# Patient Record
Sex: MALE | Race: WHITE | NOT HISPANIC OR LATINO | Employment: UNEMPLOYED | URBAN - METROPOLITAN AREA
[De-identification: names, ages, dates, MRNs, and addresses within clinical notes are randomized per-mention and may not be internally consistent; named-entity substitution may affect disease eponyms.]

---

## 2018-04-05 PROBLEM — Z79.899 CONTROLLED SUBSTANCE AGREEMENT SIGNED: Status: ACTIVE | Noted: 2018-04-05

## 2018-04-11 PROBLEM — Z79.899 CONTROLLED SUBSTANCE AGREEMENT SIGNED: Status: RESOLVED | Noted: 2018-04-05 | Resolved: 2018-04-11

## 2018-04-11 PROBLEM — Z91.148 CONTROLLED SUBSTANCE AGREEMENT BROKEN: Status: ACTIVE | Noted: 2018-04-11

## 2019-01-15 PROBLEM — Z79.899 CONTROLLED SUBSTANCE AGREEMENT SIGNED: Status: ACTIVE | Noted: 2019-01-15

## 2019-01-15 PROBLEM — Z91.148 CONTROLLED SUBSTANCE AGREEMENT BROKEN: Status: RESOLVED | Noted: 2018-04-11 | Resolved: 2019-01-15

## 2021-08-23 PROBLEM — R93.5 ABNORMAL ABDOMINAL ULTRASOUND: Status: ACTIVE | Noted: 2021-08-23

## 2023-01-26 PROBLEM — R79.89 ELEVATED LFTS: Status: ACTIVE | Noted: 2023-01-26

## 2023-02-15 ENCOUNTER — HOSPITAL ENCOUNTER (INPATIENT)
Facility: MEDICAL CENTER | Age: 38
LOS: 3 days | DRG: 419 | End: 2023-02-18
Attending: INTERNAL MEDICINE | Admitting: INTERNAL MEDICINE
Payer: COMMERCIAL

## 2023-02-15 PROBLEM — K80.50 CHOLEDOCHOLITHIASIS: Status: ACTIVE | Noted: 2023-02-15

## 2023-02-15 PROCEDURE — 700111 HCHG RX REV CODE 636 W/ 250 OVERRIDE (IP): Performed by: INTERNAL MEDICINE

## 2023-02-15 PROCEDURE — 700102 HCHG RX REV CODE 250 W/ 637 OVERRIDE(OP): Performed by: INTERNAL MEDICINE

## 2023-02-15 PROCEDURE — 770004 HCHG ROOM/CARE - ONCOLOGY PRIVATE *

## 2023-02-15 PROCEDURE — A9270 NON-COVERED ITEM OR SERVICE: HCPCS | Performed by: INTERNAL MEDICINE

## 2023-02-15 PROCEDURE — 99223 1ST HOSP IP/OBS HIGH 75: CPT | Performed by: INTERNAL MEDICINE

## 2023-02-15 RX ORDER — DIPHENHYDRAMINE HYDROCHLORIDE 50 MG/ML
25 INJECTION INTRAMUSCULAR; INTRAVENOUS EVERY 6 HOURS PRN
Status: DISCONTINUED | OUTPATIENT
Start: 2023-02-15 | End: 2023-02-18 | Stop reason: HOSPADM

## 2023-02-15 RX ORDER — BISACODYL 10 MG
10 SUPPOSITORY, RECTAL RECTAL
Status: DISCONTINUED | OUTPATIENT
Start: 2023-02-15 | End: 2023-02-18 | Stop reason: HOSPADM

## 2023-02-15 RX ORDER — ONDANSETRON 2 MG/ML
4 INJECTION INTRAMUSCULAR; INTRAVENOUS EVERY 4 HOURS PRN
Status: DISCONTINUED | OUTPATIENT
Start: 2023-02-15 | End: 2023-02-15

## 2023-02-15 RX ORDER — OMEPRAZOLE 20 MG/1
20 CAPSULE, DELAYED RELEASE ORAL 2 TIMES DAILY
Status: DISCONTINUED | OUTPATIENT
Start: 2023-02-15 | End: 2023-02-18 | Stop reason: HOSPADM

## 2023-02-15 RX ORDER — POLYETHYLENE GLYCOL 3350 17 G/17G
1 POWDER, FOR SOLUTION ORAL
Status: DISCONTINUED | OUTPATIENT
Start: 2023-02-15 | End: 2023-02-18 | Stop reason: HOSPADM

## 2023-02-15 RX ORDER — PROCHLORPERAZINE EDISYLATE 5 MG/ML
5-10 INJECTION INTRAMUSCULAR; INTRAVENOUS EVERY 4 HOURS PRN
Status: DISCONTINUED | OUTPATIENT
Start: 2023-02-15 | End: 2023-02-18 | Stop reason: HOSPADM

## 2023-02-15 RX ORDER — ONDANSETRON 4 MG/1
4 TABLET, ORALLY DISINTEGRATING ORAL EVERY 4 HOURS PRN
Status: DISCONTINUED | OUTPATIENT
Start: 2023-02-15 | End: 2023-02-18 | Stop reason: HOSPADM

## 2023-02-15 RX ORDER — DULOXETIN HYDROCHLORIDE 20 MG/1
60 CAPSULE, DELAYED RELEASE ORAL DAILY
Status: DISCONTINUED | OUTPATIENT
Start: 2023-02-16 | End: 2023-02-18 | Stop reason: HOSPADM

## 2023-02-15 RX ORDER — AMOXICILLIN 250 MG
2 CAPSULE ORAL 2 TIMES DAILY
Status: DISCONTINUED | OUTPATIENT
Start: 2023-02-16 | End: 2023-02-18 | Stop reason: HOSPADM

## 2023-02-15 RX ORDER — PROMETHAZINE HYDROCHLORIDE 25 MG/1
12.5-25 SUPPOSITORY RECTAL EVERY 4 HOURS PRN
Status: DISCONTINUED | OUTPATIENT
Start: 2023-02-15 | End: 2023-02-18 | Stop reason: HOSPADM

## 2023-02-15 RX ORDER — CLONAZEPAM 1 MG/1
1 TABLET ORAL
Status: DISCONTINUED | OUTPATIENT
Start: 2023-02-15 | End: 2023-02-18 | Stop reason: HOSPADM

## 2023-02-15 RX ORDER — PROMETHAZINE HYDROCHLORIDE 25 MG/1
12.5-25 TABLET ORAL EVERY 4 HOURS PRN
Status: DISCONTINUED | OUTPATIENT
Start: 2023-02-15 | End: 2023-02-18 | Stop reason: HOSPADM

## 2023-02-15 RX ORDER — ONDANSETRON 2 MG/ML
4 INJECTION INTRAMUSCULAR; INTRAVENOUS EVERY 4 HOURS PRN
Status: DISCONTINUED | OUTPATIENT
Start: 2023-02-15 | End: 2023-02-18 | Stop reason: HOSPADM

## 2023-02-15 RX ADMIN — OMEPRAZOLE 20 MG: 20 CAPSULE, DELAYED RELEASE ORAL at 22:44

## 2023-02-15 RX ADMIN — DIPHENHYDRAMINE HYDROCHLORIDE 25 MG: 50 INJECTION INTRAMUSCULAR; INTRAVENOUS at 22:44

## 2023-02-15 ASSESSMENT — COGNITIVE AND FUNCTIONAL STATUS - GENERAL
SUGGESTED CMS G CODE MODIFIER DAILY ACTIVITY: CH
DAILY ACTIVITIY SCORE: 24
MOBILITY SCORE: 24
SUGGESTED CMS G CODE MODIFIER MOBILITY: CH

## 2023-02-15 ASSESSMENT — ENCOUNTER SYMPTOMS
NAUSEA: 0
VOMITING: 0
CHILLS: 0
ABDOMINAL PAIN: 0
INSOMNIA: 0
EYE PAIN: 0
BACK PAIN: 0
SHORTNESS OF BREATH: 0
HEADACHES: 0
ROS SKIN COMMENTS: YELLOW SKIN
PALPITATIONS: 0
BLURRED VISION: 0
FEVER: 0
DIZZINESS: 0
COUGH: 0
NERVOUS/ANXIOUS: 0

## 2023-02-15 ASSESSMENT — LIFESTYLE VARIABLES
EVER HAD A DRINK FIRST THING IN THE MORNING TO STEADY YOUR NERVES TO GET RID OF A HANGOVER: NO
HAVE YOU EVER FELT YOU SHOULD CUT DOWN ON YOUR DRINKING: NO
ALCOHOL_USE: NO
EVER FELT BAD OR GUILTY ABOUT YOUR DRINKING: NO
CONSUMPTION TOTAL: NEGATIVE
TOTAL SCORE: 0
DOES PATIENT WANT TO STOP DRINKING: NO
TOTAL SCORE: 0
ON A TYPICAL DAY WHEN YOU DRINK ALCOHOL HOW MANY DRINKS DO YOU HAVE: 0
HAVE PEOPLE ANNOYED YOU BY CRITICIZING YOUR DRINKING: NO
TOTAL SCORE: 0
AVERAGE NUMBER OF DAYS PER WEEK YOU HAVE A DRINK CONTAINING ALCOHOL: 0
HOW MANY TIMES IN THE PAST YEAR HAVE YOU HAD 5 OR MORE DRINKS IN A DAY: 0

## 2023-02-15 ASSESSMENT — FIBROSIS 4 INDEX: FIB4 SCORE: 2.1

## 2023-02-15 ASSESSMENT — PATIENT HEALTH QUESTIONNAIRE - PHQ9
SUM OF ALL RESPONSES TO PHQ9 QUESTIONS 1 AND 2: 0
2. FEELING DOWN, DEPRESSED, IRRITABLE, OR HOPELESS: NOT AT ALL
1. LITTLE INTEREST OR PLEASURE IN DOING THINGS: NOT AT ALL

## 2023-02-15 ASSESSMENT — PAIN DESCRIPTION - PAIN TYPE: TYPE: ACUTE PAIN

## 2023-02-16 ENCOUNTER — ANESTHESIA EVENT (OUTPATIENT)
Dept: SURGERY | Facility: MEDICAL CENTER | Age: 38
DRG: 419 | End: 2023-02-16
Payer: COMMERCIAL

## 2023-02-16 ENCOUNTER — APPOINTMENT (OUTPATIENT)
Dept: RADIOLOGY | Facility: MEDICAL CENTER | Age: 38
DRG: 419 | End: 2023-02-16
Attending: INTERNAL MEDICINE
Payer: COMMERCIAL

## 2023-02-16 ENCOUNTER — ANESTHESIA (OUTPATIENT)
Dept: SURGERY | Facility: MEDICAL CENTER | Age: 38
DRG: 419 | End: 2023-02-16
Payer: COMMERCIAL

## 2023-02-16 LAB
ALBUMIN SERPL BCP-MCNC: 3.9 G/DL (ref 3.2–4.9)
ALBUMIN/GLOB SERPL: 1.4 G/DL
ALP SERPL-CCNC: 342 U/L (ref 30–99)
ALT SERPL-CCNC: 303 U/L (ref 2–50)
ANION GAP SERPL CALC-SCNC: 11 MMOL/L (ref 7–16)
AST SERPL-CCNC: 126 U/L (ref 12–45)
BILIRUB SERPL-MCNC: 3.5 MG/DL (ref 0.1–1.5)
BUN SERPL-MCNC: 11 MG/DL (ref 8–22)
CALCIUM ALBUM COR SERPL-MCNC: 9.5 MG/DL (ref 8.5–10.5)
CALCIUM SERPL-MCNC: 9.4 MG/DL (ref 8.5–10.5)
CHLORIDE SERPL-SCNC: 106 MMOL/L (ref 96–112)
CO2 SERPL-SCNC: 22 MMOL/L (ref 20–33)
CREAT SERPL-MCNC: 0.86 MG/DL (ref 0.5–1.4)
ERYTHROCYTE [DISTWIDTH] IN BLOOD BY AUTOMATED COUNT: 38.3 FL (ref 35.9–50)
GFR SERPLBLD CREATININE-BSD FMLA CKD-EPI: 113 ML/MIN/1.73 M 2
GLOBULIN SER CALC-MCNC: 2.7 G/DL (ref 1.9–3.5)
GLUCOSE SERPL-MCNC: 79 MG/DL (ref 65–99)
HCT VFR BLD AUTO: 39.9 % (ref 42–52)
HGB BLD-MCNC: 13.6 G/DL (ref 14–18)
MAGNESIUM SERPL-MCNC: 1.9 MG/DL (ref 1.5–2.5)
MCH RBC QN AUTO: 30.8 PG (ref 27–33)
MCHC RBC AUTO-ENTMCNC: 34.1 G/DL (ref 33.7–35.3)
MCV RBC AUTO: 90.3 FL (ref 81.4–97.8)
PLATELET # BLD AUTO: 197 K/UL (ref 164–446)
PMV BLD AUTO: 9.7 FL (ref 9–12.9)
POTASSIUM SERPL-SCNC: 3.9 MMOL/L (ref 3.6–5.5)
PROT SERPL-MCNC: 6.6 G/DL (ref 6–8.2)
RBC # BLD AUTO: 4.42 M/UL (ref 4.7–6.1)
SODIUM SERPL-SCNC: 139 MMOL/L (ref 135–145)
WBC # BLD AUTO: 5.1 K/UL (ref 4.8–10.8)

## 2023-02-16 PROCEDURE — 36415 COLL VENOUS BLD VENIPUNCTURE: CPT

## 2023-02-16 PROCEDURE — 700102 HCHG RX REV CODE 250 W/ 637 OVERRIDE(OP): Performed by: INTERNAL MEDICINE

## 2023-02-16 PROCEDURE — C1769 GUIDE WIRE: HCPCS | Performed by: INTERNAL MEDICINE

## 2023-02-16 PROCEDURE — 700111 HCHG RX REV CODE 636 W/ 250 OVERRIDE (IP): Performed by: ANESTHESIOLOGY

## 2023-02-16 PROCEDURE — 700102 HCHG RX REV CODE 250 W/ 637 OVERRIDE(OP)

## 2023-02-16 PROCEDURE — 80053 COMPREHEN METABOLIC PANEL: CPT

## 2023-02-16 PROCEDURE — 700117 HCHG RX CONTRAST REV CODE 255: Performed by: INTERNAL MEDICINE

## 2023-02-16 PROCEDURE — 83735 ASSAY OF MAGNESIUM: CPT

## 2023-02-16 PROCEDURE — 43264 ERCP REMOVE DUCT CALCULI: CPT | Performed by: INTERNAL MEDICINE

## 2023-02-16 PROCEDURE — 160039 HCHG SURGERY MINUTES - EA ADDL 1 MIN LEVEL 3: Performed by: INTERNAL MEDICINE

## 2023-02-16 PROCEDURE — 0FC98ZZ EXTIRPATION OF MATTER FROM COMMON BILE DUCT, VIA NATURAL OR ARTIFICIAL OPENING ENDOSCOPIC: ICD-10-PCS | Performed by: INTERNAL MEDICINE

## 2023-02-16 PROCEDURE — 160035 HCHG PACU - 1ST 60 MINS PHASE I: Performed by: INTERNAL MEDICINE

## 2023-02-16 PROCEDURE — A9270 NON-COVERED ITEM OR SERVICE: HCPCS | Performed by: INTERNAL MEDICINE

## 2023-02-16 PROCEDURE — 160028 HCHG SURGERY MINUTES - 1ST 30 MINS LEVEL 3: Performed by: INTERNAL MEDICINE

## 2023-02-16 PROCEDURE — 43262 ENDO CHOLANGIOPANCREATOGRAPH: CPT | Performed by: INTERNAL MEDICINE

## 2023-02-16 PROCEDURE — 700101 HCHG RX REV CODE 250: Performed by: ANESTHESIOLOGY

## 2023-02-16 PROCEDURE — 99999 PR NO CHARGE: CPT | Performed by: SPECIALIST

## 2023-02-16 PROCEDURE — 770004 HCHG ROOM/CARE - ONCOLOGY PRIVATE *

## 2023-02-16 PROCEDURE — 700105 HCHG RX REV CODE 258: Performed by: ANESTHESIOLOGY

## 2023-02-16 PROCEDURE — 99252 IP/OBS CONSLTJ NEW/EST SF 35: CPT | Performed by: SURGERY

## 2023-02-16 PROCEDURE — 160002 HCHG RECOVERY MINUTES (STAT): Performed by: INTERNAL MEDICINE

## 2023-02-16 PROCEDURE — 00732 ANES UPR GI NDSC PX ERCP: CPT | Performed by: ANESTHESIOLOGY

## 2023-02-16 PROCEDURE — 160048 HCHG OR STATISTICAL LEVEL 1-5: Performed by: INTERNAL MEDICINE

## 2023-02-16 PROCEDURE — 99233 SBSQ HOSP IP/OBS HIGH 50: CPT | Performed by: INTERNAL MEDICINE

## 2023-02-16 PROCEDURE — 160009 HCHG ANES TIME/MIN: Performed by: INTERNAL MEDICINE

## 2023-02-16 PROCEDURE — 85027 COMPLETE CBC AUTOMATED: CPT

## 2023-02-16 PROCEDURE — C1889 IMPLANT/INSERT DEVICE, NOC: HCPCS | Performed by: INTERNAL MEDICINE

## 2023-02-16 PROCEDURE — A9270 NON-COVERED ITEM OR SERVICE: HCPCS

## 2023-02-16 RX ORDER — SODIUM CHLORIDE, SODIUM LACTATE, POTASSIUM CHLORIDE, CALCIUM CHLORIDE 600; 310; 30; 20 MG/100ML; MG/100ML; MG/100ML; MG/100ML
INJECTION, SOLUTION INTRAVENOUS CONTINUOUS
Status: DISCONTINUED | OUTPATIENT
Start: 2023-02-16 | End: 2023-02-16 | Stop reason: HOSPADM

## 2023-02-16 RX ORDER — OXYCODONE HYDROCHLORIDE 5 MG/1
5 TABLET ORAL
Status: DISCONTINUED | OUTPATIENT
Start: 2023-02-16 | End: 2023-02-18 | Stop reason: HOSPADM

## 2023-02-16 RX ORDER — INDOMETHACIN 50 MG/1
SUPPOSITORY RECTAL
Status: COMPLETED
Start: 2023-02-16 | End: 2023-02-16

## 2023-02-16 RX ORDER — ONDANSETRON 2 MG/ML
4 INJECTION INTRAMUSCULAR; INTRAVENOUS
Status: DISCONTINUED | OUTPATIENT
Start: 2023-02-16 | End: 2023-02-16 | Stop reason: HOSPADM

## 2023-02-16 RX ORDER — MEPERIDINE HYDROCHLORIDE 25 MG/ML
6.25 INJECTION INTRAMUSCULAR; INTRAVENOUS; SUBCUTANEOUS
Status: DISCONTINUED | OUTPATIENT
Start: 2023-02-16 | End: 2023-02-16 | Stop reason: HOSPADM

## 2023-02-16 RX ORDER — ROCURONIUM BROMIDE 10 MG/ML
INJECTION, SOLUTION INTRAVENOUS PRN
Status: DISCONTINUED | OUTPATIENT
Start: 2023-02-16 | End: 2023-02-16 | Stop reason: SURG

## 2023-02-16 RX ORDER — HYDROMORPHONE HYDROCHLORIDE 1 MG/ML
0.4 INJECTION, SOLUTION INTRAMUSCULAR; INTRAVENOUS; SUBCUTANEOUS
Status: DISCONTINUED | OUTPATIENT
Start: 2023-02-16 | End: 2023-02-16 | Stop reason: HOSPADM

## 2023-02-16 RX ORDER — HYDROMORPHONE HYDROCHLORIDE 1 MG/ML
0.1 INJECTION, SOLUTION INTRAMUSCULAR; INTRAVENOUS; SUBCUTANEOUS
Status: DISCONTINUED | OUTPATIENT
Start: 2023-02-16 | End: 2023-02-16 | Stop reason: HOSPADM

## 2023-02-16 RX ORDER — HYDROMORPHONE HYDROCHLORIDE 1 MG/ML
0.2 INJECTION, SOLUTION INTRAMUSCULAR; INTRAVENOUS; SUBCUTANEOUS
Status: DISCONTINUED | OUTPATIENT
Start: 2023-02-16 | End: 2023-02-16 | Stop reason: HOSPADM

## 2023-02-16 RX ORDER — POTASSIUM CHLORIDE 20 MEQ/1
20 TABLET, EXTENDED RELEASE ORAL ONCE
Status: COMPLETED | OUTPATIENT
Start: 2023-02-16 | End: 2023-02-16

## 2023-02-16 RX ORDER — SODIUM CHLORIDE, SODIUM LACTATE, POTASSIUM CHLORIDE, CALCIUM CHLORIDE 600; 310; 30; 20 MG/100ML; MG/100ML; MG/100ML; MG/100ML
INJECTION, SOLUTION INTRAVENOUS
Status: DISCONTINUED | OUTPATIENT
Start: 2023-02-16 | End: 2023-02-16 | Stop reason: SURG

## 2023-02-16 RX ORDER — LIDOCAINE HYDROCHLORIDE 40 MG/ML
SOLUTION TOPICAL PRN
Status: DISCONTINUED | OUTPATIENT
Start: 2023-02-16 | End: 2023-02-16 | Stop reason: SURG

## 2023-02-16 RX ORDER — LANOLIN ALCOHOL/MO/W.PET/CERES
400 CREAM (GRAM) TOPICAL 2 TIMES DAILY
Status: COMPLETED | OUTPATIENT
Start: 2023-02-16 | End: 2023-02-16

## 2023-02-16 RX ORDER — INDOMETHACIN 50 MG/1
100 SUPPOSITORY RECTAL ONCE
Status: COMPLETED | OUTPATIENT
Start: 2023-02-16 | End: 2023-02-16

## 2023-02-16 RX ORDER — DIPHENHYDRAMINE HYDROCHLORIDE 50 MG/ML
12.5 INJECTION INTRAMUSCULAR; INTRAVENOUS
Status: DISCONTINUED | OUTPATIENT
Start: 2023-02-16 | End: 2023-02-16 | Stop reason: HOSPADM

## 2023-02-16 RX ORDER — OXYCODONE HCL 5 MG/5 ML
5 SOLUTION, ORAL ORAL
Status: DISCONTINUED | OUTPATIENT
Start: 2023-02-16 | End: 2023-02-16 | Stop reason: HOSPADM

## 2023-02-16 RX ORDER — OXYCODONE HYDROCHLORIDE 10 MG/1
10 TABLET ORAL
Status: DISCONTINUED | OUTPATIENT
Start: 2023-02-16 | End: 2023-02-18 | Stop reason: HOSPADM

## 2023-02-16 RX ORDER — HYDROMORPHONE HYDROCHLORIDE 1 MG/ML
0.5 INJECTION, SOLUTION INTRAMUSCULAR; INTRAVENOUS; SUBCUTANEOUS
Status: DISCONTINUED | OUTPATIENT
Start: 2023-02-16 | End: 2023-02-18 | Stop reason: HOSPADM

## 2023-02-16 RX ORDER — OXYCODONE HCL 5 MG/5 ML
10 SOLUTION, ORAL ORAL
Status: DISCONTINUED | OUTPATIENT
Start: 2023-02-16 | End: 2023-02-16 | Stop reason: HOSPADM

## 2023-02-16 RX ORDER — SUCCINYLCHOLINE CHLORIDE 20 MG/ML
INJECTION INTRAMUSCULAR; INTRAVENOUS PRN
Status: DISCONTINUED | OUTPATIENT
Start: 2023-02-16 | End: 2023-02-16 | Stop reason: SURG

## 2023-02-16 RX ORDER — HALOPERIDOL 5 MG/ML
1 INJECTION INTRAMUSCULAR
Status: DISCONTINUED | OUTPATIENT
Start: 2023-02-16 | End: 2023-02-16 | Stop reason: HOSPADM

## 2023-02-16 RX ADMIN — SUCCINYLCHOLINE CHLORIDE 160 MG: 20 INJECTION, SOLUTION INTRAMUSCULAR; INTRAVENOUS; PARENTERAL at 13:47

## 2023-02-16 RX ADMIN — MAGNESIUM GLUCONATE 500 MG ORAL TABLET 400 MG: 500 TABLET ORAL at 08:37

## 2023-02-16 RX ADMIN — INDOMETHACIN 100 MG: 50 SUPPOSITORY RECTAL at 14:41

## 2023-02-16 RX ADMIN — DULOXETINE HYDROCHLORIDE 60 MG: 20 CAPSULE, DELAYED RELEASE ORAL at 05:07

## 2023-02-16 RX ADMIN — PROPOFOL 200 MG: 10 INJECTION, EMULSION INTRAVENOUS at 13:47

## 2023-02-16 RX ADMIN — SENNOSIDES AND DOCUSATE SODIUM 2 TABLET: 50; 8.6 TABLET ORAL at 19:25

## 2023-02-16 RX ADMIN — OMEPRAZOLE 20 MG: 20 CAPSULE, DELAYED RELEASE ORAL at 19:25

## 2023-02-16 RX ADMIN — OMEPRAZOLE 20 MG: 20 CAPSULE, DELAYED RELEASE ORAL at 05:07

## 2023-02-16 RX ADMIN — PROPOFOL 100 MG: 10 INJECTION, EMULSION INTRAVENOUS at 13:58

## 2023-02-16 RX ADMIN — POTASSIUM CHLORIDE 20 MEQ: 1500 TABLET, EXTENDED RELEASE ORAL at 08:35

## 2023-02-16 RX ADMIN — LIDOCAINE HYDROCHLORIDE 4 ML: 40 SOLUTION TOPICAL at 13:48

## 2023-02-16 RX ADMIN — OXYCODONE 5 MG: 5 TABLET ORAL at 19:25

## 2023-02-16 RX ADMIN — FENTANYL CITRATE 100 MCG: 50 INJECTION, SOLUTION INTRAMUSCULAR; INTRAVENOUS at 13:47

## 2023-02-16 RX ADMIN — ROCURONIUM BROMIDE 10 MG: 10 INJECTION, SOLUTION INTRAVENOUS at 13:47

## 2023-02-16 RX ADMIN — PROPOFOL 100 MG: 10 INJECTION, EMULSION INTRAVENOUS at 13:53

## 2023-02-16 RX ADMIN — MAGNESIUM GLUCONATE 500 MG ORAL TABLET 400 MG: 500 TABLET ORAL at 19:25

## 2023-02-16 RX ADMIN — CLONAZEPAM 1 MG: 1 TABLET ORAL at 08:35

## 2023-02-16 RX ADMIN — SODIUM CHLORIDE, POTASSIUM CHLORIDE, SODIUM LACTATE AND CALCIUM CHLORIDE: 600; 310; 30; 20 INJECTION, SOLUTION INTRAVENOUS at 13:44

## 2023-02-16 ASSESSMENT — ENCOUNTER SYMPTOMS
DIARRHEA: 0
VOMITING: 0
VOMITING: 1
BLOOD IN STOOL: 0
NAUSEA: 0
PALPITATIONS: 0
NERVOUS/ANXIOUS: 0
DEPRESSION: 0
CONSTIPATION: 0
BLURRED VISION: 0
CHILLS: 0
ABDOMINAL PAIN: 1
WEAKNESS: 0
FEVER: 0
DIZZINESS: 0
SORE THROAT: 0
HEADACHES: 0
ABDOMINAL PAIN: 0
BACK PAIN: 0
HEARTBURN: 0
SHORTNESS OF BREATH: 0
COUGH: 0
FALLS: 0
DEPRESSION: 1
NAUSEA: 1

## 2023-02-16 ASSESSMENT — PAIN SCALES - GENERAL: PAIN_LEVEL: 0

## 2023-02-16 ASSESSMENT — PAIN DESCRIPTION - PAIN TYPE
TYPE: ACUTE PAIN
TYPE: ACUTE PAIN
TYPE: SURGICAL PAIN

## 2023-02-16 ASSESSMENT — LIFESTYLE VARIABLES: SUBSTANCE_ABUSE: 1

## 2023-02-16 NOTE — ANESTHESIA PROCEDURE NOTES
Airway    Date/Time: 2/16/2023 1:48 PM  Performed by: John Cotter M.D.  Authorized by: John Cotter M.D.     Location:  OR  Urgency:  Elective  Indications for Airway Management:  Anesthesia      Spontaneous Ventilation: absent    Sedation Level:  Deep  Preoxygenated: Yes    Patient Position:  Sniffing  Final Airway Type:  Endotracheal airway  Final Endotracheal Airway:  ETT  Cuffed: Yes    Technique Used for Successful ETT Placement:  Direct laryngoscopy    Insertion Site:  Oral  Blade Type:  Alethea  Laryngoscope Blade/Videolaryngoscope Blade Size:  3  ETT Size (mm):  7.5  Measured from:  Teeth  ETT to Teeth (cm):  23  Placement Verified by: auscultation and capnometry    Cormack-Lehane Classification:  Grade I - full view of glottis  Number of Attempts at Approach:  1

## 2023-02-16 NOTE — PROGRESS NOTES
[facility] Medicine Daily Progress Note    Date of Service  2/16/2023    Chief Complaint  Adonis Mann is a 37 y.o. male admitted 2/15/2023 with abdominal pain, nausea, vomiting, jaundice    Hospital Course  No notes on file    Mr. Adonis Mann is a 37 y.o. male who presented on 2/15/2023 with abdominal pain, nausea, vomiting, jaundice.  Initially presented to [facility].  An MRCP showed a 9 mm distal common bile duct stone.  LFTs were concerning for choledocholithiasis and patient was transferred to [facility] for GI evaluation.    Bilirubin initially 5.9.  Lipase 52.    Status post ERCP with sphincterotomy balloon sweep on 2/16.    General surgery was consulted for cholecystectomy.    Interval Problem Update  Patient was seen and examined at bedside.  I have personally reviewed and interpreted vitals, labs, and imaging.    2/16.  Afebrile.  Has been bradycardic.  On room air.  Replete magnesium, potassium.  Denies fevers, chills, chest pains, shortness of breath.  Abelardo pain currently resolved.  Plan for ERCP this afternoon.  He does not appear significantly jaundiced but does report generalized itching.    I have discussed this patient's plan of care and discharge plan at IDT rounds today with Case Management, Nursing, Nursing leadership, and other members of the IDT team.    Consultants/Specialty  general surgery and GI    Code Status  Full Code    Disposition  Patient is not medically cleared for discharge.   Anticipate discharge to to home with close outpatient follow-up.  I have placed the appropriate orders for post-discharge needs.    Review of Systems  Review of Systems   Constitutional:  Negative for chills and fever.   HENT:  Negative for congestion and sore throat.    Eyes:  Negative for blurred vision.   Respiratory:  Negative for cough and shortness of breath.    Cardiovascular:  Negative for chest pain, palpitations and leg swelling.   Gastrointestinal:  Positive for abdominal  pain, nausea and vomiting. Negative for constipation and diarrhea.   Genitourinary:  Negative for dysuria, frequency and urgency.   Musculoskeletal:  Negative for falls.   Skin:  Positive for itching. Negative for rash.   Neurological:  Negative for dizziness, weakness and headaches.   Psychiatric/Behavioral:  Negative for depression. The patient is not nervous/anxious.    All other systems reviewed and are negative.     Physical Exam  Temp:  [36.4 °C (97.5 °F)-37.1 °C (98.8 °F)] 36.8 °C (98.3 °F)  Pulse:  [53-74] 55  Resp:  [16-18] 18  BP: (107-123)/(62-73) 114/71  SpO2:  [95 %-97 %] 97 %    Physical Exam  Vitals and nursing note reviewed.   Constitutional:       Appearance: Normal appearance. He is ill-appearing.   HENT:      Head: Normocephalic and atraumatic.      Nose: Nose normal.      Mouth/Throat:      Mouth: Mucous membranes are moist.      Pharynx: Oropharynx is clear.   Eyes:      Extraocular Movements: Extraocular movements intact.      Conjunctiva/sclera: Conjunctivae normal.   Cardiovascular:      Rate and Rhythm: Regular rhythm. Bradycardia present.      Pulses: Normal pulses.      Heart sounds: Normal heart sounds. No murmur heard.    No friction rub. No gallop.   Pulmonary:      Effort: Pulmonary effort is normal. No respiratory distress.      Breath sounds: Normal breath sounds. No wheezing or rales.   Chest:      Chest wall: No tenderness.   Abdominal:      General: Abdomen is flat. Bowel sounds are normal. There is no distension.      Palpations: Abdomen is soft. There is no mass.      Tenderness: There is abdominal tenderness. There is no guarding.   Musculoskeletal:         General: Normal range of motion.      Cervical back: Normal range of motion and neck supple.   Skin:     General: Skin is warm.      Capillary Refill: Capillary refill takes less than 2 seconds.   Neurological:      General: No focal deficit present.      Mental Status: He is alert and oriented to person, place, and time.  Mental status is at baseline.      Cranial Nerves: No cranial nerve deficit.      Motor: No weakness.   Psychiatric:         Mood and Affect: Mood normal.         Behavior: Behavior normal.       Fluids  No intake or output data in the 24 hours ending 02/16/23 0724    Laboratory  Recent Labs     02/15/23  0000 02/16/23  0028   WBC 5.3 5.1   RBC 4.87 4.42*   HEMOGLOBIN 14.9 13.6*   HEMATOCRIT 43.0 39.9*   MCV 88.3 90.3   MCH 30.6 30.8   MCHC 34.7 34.1   RDW 11.6 38.3   PLATELETCT 202 197   MPV 9.4 9.7     Recent Labs     02/15/23  0000 02/16/23  0028   SODIUM 138 139   POTASSIUM 3.7 3.9   CHLORIDE 107 106   CO2 20* 22   GLUCOSE 97 79   BUN 11 11   CREATININE 0.8 0.86   CALCIUM 9.3 9.4     Recent Labs     02/15/23  0000   INR 0.99               Imaging  DX-PORTABLE FLUOROSCOPY < 1 HOUR   Final Result      Portable intraoperative imaging with findings as described above.           Assessment/Plan  * Choledocholithiasis- (present on admission)  Assessment & Plan  Patient's outside labs confirm obstructive jaundice due to choledocholithiasis, MRCP showed 9 mm distal common bile duct stone  - I have reached out to [facility] Dr. Kauffman through [employer], she is aware and will evaluate patient in the morning  - Keep patient n.p.o. after midnight, cleared liquid diet at the moment  - Pain regimen in place  - Hold anticoagulation  Status post ERCP with sphincterotomy balloon sweep 2/16  Consulted general surgery Dr. Lemons    Hyperbilirubinemia- (present on admission)  Assessment & Plan  T. bili 5.9  Due to obstructive choledocholithiasis  Repeat CMP, monitor    Hyperlipidemia- (present on admission)  Assessment & Plan  Lipid panel in January showed total cholesterol 231, , HDL 52  Hold statin at this time as patient has elevated LFTs and obstructive jaundice  Continue lifestyle modification on discharge    Depression- (present on admission)  Assessment & Plan  Continue home Cymbalta    Controlled substance agreement signed:  BFM, clonazepam, CSA/UDS, June 2022- (present on admission)  Assessment & Plan  Continue chronic Klonopin         VTE prophylaxis: SCDs/TEDs hold anticoagulation with plan for ERCP and cholecystectomy    I have performed a physical exam and reviewed and updated ROS and Plan today (2/16/2023). In review of yesterday's note (2/15/2023), there are no changes except as documented above.

## 2023-02-16 NOTE — CARE PLAN
The patient is Stable - Low risk of patient condition declining or worsening    Shift Goals  Clinical Goals: Admit profile, NPO at midnight  Patient Goals: Control itching    Progress made toward(s) clinical / shift goals:     Problem: Knowledge Deficit - Standard  Goal: Patient and family/care givers will demonstrate understanding of plan of care, disease process/condition, diagnostic tests and medications  Outcome: Progressing     Problem: Psychosocial  Goal: Patient's level of anxiety will decrease  Outcome: Progressing

## 2023-02-16 NOTE — CONSULTS
..Date of Consultation:  2/16/2023    Patient: : Adonis Mann  MRN: [id number]    Referring Physician:  Dr. Sae Rocha     GI:MALINDA Ibrahim     Reason for Consultation: Choledocholithiasis    History of Present Illness:     Adonis Mann is a 37 y.o. male with past medical history of depression who presented 2/15/2023 with abdominal pain and yellowing skin.  Patient describes intermittent abdominal pain for over a year which he saw his PCP and was diagnosed with fatty liver. On Monday night, he experienced sudden episode of midepigastric pain with associated nausea and vomiting. On Wednesday morning, he woke up and noticed yellowing of his eyes and skin with brown urine, which prompted him to come to the ED. He denies hematuria, hematochezia, melena, diarrhea, fever, or chills. He has never had any abdominal surgery.    In the ED, labs remarkable for , , Alk phos 325, lipase 52, T Bili of 5.9, and indirect bili of 1.3. RUQ US significant for thickened gallbladder and 9mm focus in right hepatic lobe favoring a hemangioma. MRCP remarkable for CBD dilation of 14 mm and 9 mm stone within the distal common bile duct.     Currently patient is afebrile and hemodynamically stable. He is denying abdominal pain, nausea, or vomiting. He does have jaundice.     Tobacco use: Denies  Alcohol use: Denies  Illicit drug use: Daily marijuana vaping    Last EGD: Never  Last colonoscopy: Never  NSAID/ASA use: Occasional  Anticoagulation use: Denies       Past Medical History:   Diagnosis Date    Anxiety     Depression          Past Surgical History:   Procedure Laterality Date    OPEN REDUCTION         Family History   Problem Relation Age of Onset    Depression Father     Depression Other        Social History     Socioeconomic History    Marital status: Single    Number of children: 0    Years of education: 12    Highest education level: GED or equivalent   Occupational History      Employer: [employer]   Tobacco Use    Smoking status: Former     Packs/day: 0.50     Types: Cigarettes     Quit date: 2022     Years since quittin.1    Smokeless tobacco: Never   Substance and Sexual Activity    Alcohol use: No    Drug use: Yes     Types: Marijuana    Sexual activity: Yes     Partners: Female     Birth control/protection: Condom   Other Topics Concern     Service No    Blood Transfusions No    Caffeine Concern Yes     Comment: 1 cup every other day    Occupational Exposure No    Hobby Hazards No    Sleep Concern Yes    Stress Concern Yes    Weight Concern No    Special Diet No    Back Care No    Exercise Yes    Bike Helmet No    Seat Belt Yes    Self-Exams No     Social Determinants of Health     Financial Resource Strain: Low Risk     Difficulty of Paying Living Expenses: Not very hard   Food Insecurity: No Food Insecurity    Worried About Running Out of Food in the Last Year: Never true    Ran Out of Food in the Last Year: Never true   Transportation Needs: No Transportation Needs    Lack of Transportation (Medical): No    Lack of Transportation (Non-Medical): No   Physical Activity: Sufficiently Active    Days of Exercise per Week: 5 days    Minutes of Exercise per Session: 120 min   Stress: No Stress Concern Present    Feeling of Stress : Only a little   Social Connections: Socially Isolated    Frequency of Communication with Friends and Family: Once a week    Frequency of Social Gatherings with Friends and Family: Once a week    Attends Gnosticism Services: Never    Active Member of Clubs or Organizations: No    Marital Status:    Housing Stability: Low Risk     Unable to Pay for Housing in the Last Year: No    Number of Places Lived in the Last Year: 1    Unstable Housing in the Last Year: No       Review of systems:  Review of Systems   Constitutional:  Negative for chills, fever and malaise/fatigue.   HENT:  Negative for hearing loss.    Eyes:  Negative for blurred  "vision.   Respiratory:  Negative for cough and shortness of breath.    Cardiovascular:  Negative for chest pain and leg swelling.   Gastrointestinal:  Negative for abdominal pain, blood in stool, constipation, diarrhea, heartburn, melena, nausea and vomiting.   Genitourinary:  Negative for hematuria.   Musculoskeletal:  Negative for back pain.   Skin:  Negative for rash.   Neurological:  Negative for dizziness and weakness.   Psychiatric/Behavioral:  Positive for depression and substance abuse.    All other systems reviewed and are negative.      Physical Exam:  Vitals:    02/15/23 2159 02/16/23 0400   BP: 107/62 114/71   Pulse: (!) 58 (!) 55   Resp: 18 18   Temp: 37.1 °C (98.8 °F) 36.8 °C (98.3 °F)   TempSrc: Temporal Temporal   SpO2: 96% 97%   Weight: 81.6 kg (180 lb)    Height: 1.778 m (5' 10\")        Physical Exam  Vitals and nursing note reviewed.   Constitutional:       General: He is not in acute distress.     Appearance: Normal appearance. He is normal weight. He is not ill-appearing.   HENT:      Head: Normocephalic and atraumatic.      Right Ear: External ear normal.      Left Ear: External ear normal.      Nose: Nose normal.      Mouth/Throat:      Mouth: Mucous membranes are moist.      Pharynx: Oropharynx is clear.   Eyes:      General: Scleral icterus present.   Cardiovascular:      Rate and Rhythm: Regular rhythm. Bradycardia present.      Pulses: Normal pulses.      Heart sounds: Normal heart sounds.   Pulmonary:      Effort: Pulmonary effort is normal.      Breath sounds: Normal breath sounds.   Abdominal:      General: Abdomen is flat. Bowel sounds are normal. There is no distension.      Palpations: Abdomen is soft.      Tenderness: There is no abdominal tenderness.   Musculoskeletal:         General: Normal range of motion.      Cervical back: Normal range of motion.   Skin:     General: Skin is warm and dry.      Capillary Refill: Capillary refill takes less than 2 seconds.      Coloration: Skin " is jaundiced.   Neurological:      Mental Status: He is alert and oriented to person, place, and time.   Psychiatric:         Mood and Affect: Mood normal.         Behavior: Behavior normal.         Labs:  Recent Labs     02/15/23  0000 02/16/23  0028   WBC 5.3 5.1   RBC 4.87 4.42*   HEMOGLOBIN 14.9 13.6*   HEMATOCRIT 43.0 39.9*   MCV 88.3 90.3   MCH 30.6 30.8   MCHC 34.7 34.1   RDW 11.6 38.3   PLATELETCT 202 197   MPV 9.4 9.7     Recent Labs     02/15/23  0000 02/16/23  0028   SODIUM 138 139   POTASSIUM 3.7 3.9   CHLORIDE 107 106   CO2 20* 22   GLUCOSE 97 79   BUN 11 11     Recent Labs     02/15/23  0000   INR 0.99       Recent Labs     02/15/23  0000 02/16/23  0028   ASTSGOT 231* 126*   ALTSGPT 451* 303*   TBILIRUBIN 5.9* 3.5*   IBILIRUBIN 1.3*  --    DBILIRUBIN 4.6*  --    ALKPHOSPHAT 325* 342*   GLOBULIN  --  2.7   INR 0.99  --          Imaging:  [id number]  Narrative: HISTORY/REASON FOR EXAM: Abdominal pain..    TECHNIQUE/EXAM DESCRIPTION:  MRI abdomen without contrast, 2/15/2023 3:49 PM    Multiplanar single shot turbo spin echo, T2, T1 in-phase, T1 opposed-phase, and T1 fat-saturated FAME sequences are performed through the abdomen.    COMPARISON: None.    FINDINGS:    Lung bases demonstrate no basilar pleural effusion.    The liver measures 19 cm in span. Liver demonstrates normal contour.    9 mm T2 hyperintense focus is noted involving the caudal margin of the right hepatic lobe, corresponding to the echogenic focus noted on prior sonographic imaging. This focus demonstrates an appearance again compatible with anterior hepatic hemangioma.    The MRI appearance of the liver is otherwise unremarkable.    The portal vein and the hepatic artery appear patent. I do not see evidence of intrahepatic biliary duct dilatation.    The common bile duct is dilated to a diameter of 14 mm at the level of the pancreatic head.    Best appreciated on coronal image #19 of series 4, is a rounded 9 mm diameter focus within  the distal common bile duct, compatible with choledocholithiasis.    The gallbladder appears contracted, and is not well evaluated. No pericholecystic fluid is noted.    The pancreas appears unremarkable.    The pancreatic duct is not dilated.    The spleen appears normal.    Stomach demonstrates a unremarkable MRI appearance.    Bosniak type I cyst are noted involving the right and left kidneys, the largest measuring 6 mm in diameter. There is no evidence of hydronephrosis.    The visualized portions of the large and small bowel demonstrate a unremarkable MRI appearance.  Impression: Choledocholithiasis.  Specifically, there is a 9 mm stone noted within the distal common bile duct, which measures 14 mm in diameter.    The remaining portions of the the common bile duct and intrahepatic biliary radicles appear normal.  US-RUQ  Narrative: HISTORY/REASON FOR EXAM:  Pain.    TECHNIQUE/EXAM DESCRIPTION: Ultrasound abdomen --Limited, 2/15/2023 1:35 PM.    COMPARISON: No comparison    FINDINGS:    Pancreatic body appears normal. Remaining portions of the pancreas are obscured.    The liver measures 16 cm in span.  The liver demonstrates normal contour and echotexture.  Echogenic focus measuring 8 mm in diameter is noted involving the caudal margin of the right hepatic lobe, and has a sonographic appearance compatible with a small hemangioma.    The portal vein is patent and demonstrates hepatopetal flow at  a velocity of 18 cm/sec.    The gallbladder is contracted. An expected date, the gallbladder is difficult to evaluate. Gallbladder wall measures 3 to 4 mm.  The sonographic technologist notes no sonographic Chance sign.    No stones or pericholecystic fluid is noted.    The common bile duct is top normal in caliber the common bile duct measures7 millimeters in diameter.    The right kidney measures 11.4 centimeters in length. The right kidney demonstrates no evidence of stones, hydronephrosis, or parenchymal  lesions.    The imaged portions of the abdominal aorta and inferior vena cava are normal in caliber.    Evaluation of the spleen and the left kidney are not included in this limited/RUQ ultrasound exam.  Impression: The gallbladder is contracted, limiting sonographic evaluation. While the gallbladder wall is thickened, thickening is felt to represent a manifestation of gallbladder contraction. Please note that there is no sonographic Chance sign and no stones   identified.    9 mm focus involving the right hepatic lobe is favored represent a hemangioma.    Common bile duct is top normal in caliber.    Impressions:  Choledocholithiasis  9mm hemangioma right hepatic lobe  Elevated LFTs  Hyperbilirubinemia  Normocytic anemia  Hyperlipidemia    MDM  Patient is a 37 year old male with past medical history of depression who presented with jaundice, elevated LFTs and hyperbilirubinemia found to have a CBD stone. Patient is agreeable to undergo ERCP then laprascopic cholecystectomy.    Recommendations:  ERCP today at 1330 with Dr. Jimenez  Orders placed for consent  Keep NPO  Hold anticoagulation      This note was generated using voice recognition software which has a small chance of producing errors of grammar and possibly content. I have made every reasonable attempt to find and correct any obvious errors, but expect that some may not be found prior to finalization of this note.

## 2023-02-16 NOTE — ASSESSMENT & PLAN NOTE
Patient's outside labs confirm obstructive jaundice due to choledocholithiasis, MRCP showed 9 mm distal common bile duct stone  - I have reached out to [facility] Dr. Kauffman through [employer], she is aware and will evaluate patient in the morning  - Keep patient n.p.o. after midnight, cleared liquid diet at the moment  - Pain regimen in place  - Hold anticoagulation  Status post ERCP with sphincterotomy balloon sweep 2/16  Status post laparoscopic cholecystectomy 2/17

## 2023-02-16 NOTE — H&P
Hospital Medicine History & Physical Note    Date of Service  2/15/2023    Primary Care Physician  Marilin Schmidt D.N.P.    Consultants  GI    Specialist Names: Dr. Kauffman ([facility])    Code Status  Full Code    Chief Complaint  Jaundice, transfer from [facility]    History of Presenting Illness  Adonis Mann is a 37 y.o. male who presented 2/15/2023 with abdominal pain and yellowing skin.  Onset today, sudden jaundice at home with abdominal pain.  Patient's abdominal pain initially was severe, nonradiating, epigastric and right upper quadrant area.  Patient has never had symptoms such as this before.  Patient with [facility], where he was found to have obstructive jaundice, highly elevated LFTs, hyperbilirubinemia.  MRCP confirmed 9 mm distal common bile duct stone.  Patient was transferred here for gastroenterology evaluation.    Currently patient is denying any significant abdominal pain but still jaundiced.  At this time patient's last bowel movement was yesterday, pill, slightly tan.  He denied any hematochezia or melena.  Patient denied any nausea or vomiting.        I discussed the plan of care with patient, family, bedside RN, charge RN, , pharmacy, and GI.    Review of Systems  Review of Systems   Constitutional:  Negative for chills and fever.   HENT:  Negative for congestion and hearing loss.    Eyes:  Negative for blurred vision and pain.   Respiratory:  Negative for cough and shortness of breath.    Cardiovascular:  Negative for chest pain and palpitations.   Gastrointestinal:  Negative for abdominal pain, nausea and vomiting.   Genitourinary:  Negative for dysuria and hematuria.   Musculoskeletal:  Negative for back pain and joint pain.   Skin:  Positive for itching. Negative for rash.        Yellow skin   Neurological:  Negative for dizziness and headaches.   Psychiatric/Behavioral:  The patient is not nervous/anxious and does not have insomnia.    All  other systems reviewed and are negative.    Past Medical History   has a past medical history of Anxiety and Depression.    Surgical History   has a past surgical history that includes open reduction.     Family History  family history includes Depression in his father and another family member.   Family history reviewed with patient. There is no family history that is pertinent to the chief complaint.     Social History   reports that he quit smoking about 6 weeks ago. His smoking use included cigarettes. He smoked an average of .5 packs per day. He has never used smokeless tobacco. He reports current drug use. Drug: Marijuana. He reports that he does not drink alcohol.    Allergies  No Known Allergies    Medications  Prior to Admission Medications   Prescriptions Last Dose Informant Patient Reported? Taking?   DULoxetine (CYMBALTA) 30 MG Cap DR Particles 2/15/2023 at 0600  No No   Sig: TAKE TWO CAPSULES BY MOUTH DAILY   clonazePAM (KLONOPIN) 1 MG Tab 2/15/2023 at 0600  No No   Sig: Take one-half to one tablet by mouth once a day if needed for anxiety attack for up to 30 days  Indications: Feeling Anxious   omeprazole (PRILOSEC) 40 MG delayed-release capsule 2/12/2023 at 0600  No No   Sig: Take one cap daily in AM before breakfast      Facility-Administered Medications: None       Physical Exam  Temp:  [36.4 °C (97.5 °F)-36.6 °C (97.9 °F)] 36.6 °C (97.9 °F)  Pulse:  [53-74] 54  Resp:  [16-18] 16  BP: (108-123)/(62-73) 116/73  SpO2:  [95 %-96 %] 96 %                          Physical Exam  Vitals and nursing note reviewed.   Constitutional:       General: He is not in acute distress.  HENT:      Head: Normocephalic and atraumatic.      Nose: Nose normal. No congestion.   Eyes:      General: No scleral icterus.     Extraocular Movements: Extraocular movements intact.   Cardiovascular:      Rate and Rhythm: Normal rate and regular rhythm.      Pulses: Normal pulses.      Heart sounds: Normal heart sounds. No murmur  heard.  Pulmonary:      Effort: Pulmonary effort is normal. No respiratory distress.      Breath sounds: Normal breath sounds.   Abdominal:      General: There is no distension.      Palpations: Abdomen is soft.      Tenderness: There is no abdominal tenderness.      Comments: Hypoactive BS   Musculoskeletal:         General: No swelling. Normal range of motion.      Cervical back: Normal range of motion and neck supple.   Skin:     General: Skin is warm.      Capillary Refill: Capillary refill takes less than 2 seconds.      Coloration: Skin is jaundiced.      Findings: No erythema.   Neurological:      General: No focal deficit present.      Mental Status: He is alert and oriented to person, place, and time. Mental status is at baseline.      Motor: No weakness.   Psychiatric:         Mood and Affect: Mood normal.         Behavior: Behavior normal.         Thought Content: Thought content normal.         Judgment: Judgment normal.       Laboratory:  Recent Labs     02/15/23  0000   WBC 5.3   RBC 4.87   HEMOGLOBIN 14.9   HEMATOCRIT 43.0   MCV 88.3   MCH 30.6   MCHC 34.7   RDW 11.6   PLATELETCT 202   MPV 9.4     Recent Labs     02/15/23  0000   SODIUM 138   POTASSIUM 3.7   CHLORIDE 107   CO2 20*   GLUCOSE 97   BUN 11   CREATININE 0.8   CALCIUM 9.3     Recent Labs     02/15/23  0000   ALTSGPT 451*   ASTSGOT 231*   ALKPHOSPHAT 325*   TBILIRUBIN 5.9*   DBILIRUBIN 4.6*   LIPASE 52   GLUCOSE 97     Recent Labs     02/15/23  0000   INR 0.99     No results for input(s): NTPROBNP in the last 72 hours.      No results for input(s): TROPONINT in the last 72 hours.    Imaging:  No orders to display       no X-Ray or EKG requiring interpretation    Assessment/Plan:  Justification for Admission Status  I anticipate this patient will require at least two midnights for appropriate medical management, necessitating inpatient admission because patient currently has obstructive jaundice, will need gastroenterology evaluation in the  morning, possible ERCP.  Afterwards as per guidelines, he will require cholecystectomy.  This will take over 2 midnights to accomplish.    Patient will need a Med/Surg bed on MEDICAL service .  The need is secondary to acute obstructive jaundice, choledocholithiasis.    * Choledocholithiasis- (present on admission)  Assessment & Plan  Patient's outside labs confirm obstructive jaundice due to choledocholithiasis, MRCP showed 9 mm distal common bile duct stone  - I have reached out to [facility] Dr. Kauffman through [employer], she is aware and will evaluate patient in the morning  - Keep patient n.p.o. after midnight, cleared liquid diet at the moment  - Pain regimen in place  - Hold anticoagulation  - After ERCP, patient will need cholecystectomy prior to discharge as per guidelines, consult general surgery after ERCP    Hyperbilirubinemia- (present on admission)  Assessment & Plan  T. bili 5.9  Due to obstructive choledocholithiasis  Repeat CMP, monitor    Hyperlipidemia- (present on admission)  Assessment & Plan  Lipid panel in January showed total cholesterol 231, , HDL 52  Hold statin at this time as patient has elevated LFTs and obstructive jaundice  Continue lifestyle modification on discharge    Depression- (present on admission)  Assessment & Plan  Continue home Cymbalta    Controlled substance agreement signed: BFM, clonazepam, CSA/UDS, June 2022- (present on admission)  Assessment & Plan  Continue chronic Klonopin        VTE prophylaxis: SCDs/TEDs    Total time spent on admission over 75 minutes.  This included my review with ER physician, review of ED events, patient's history, outside records, recent records, face to face interview, physical examination; my review of lab results (CBC, chemistry panel), imaging review (CXR) and ECG.   In addition, counseling patient    Please note that this dictation was created using voice recognition software. I have made every reasonable attempt to correct obvious  errors, but there may be errors of grammar and possibly content that I did not discover before finalizing the note.

## 2023-02-16 NOTE — PROGRESS NOTES
4 Eyes Skin Assessment Completed by Mateusz RN and SHANE Saxena.    Generalized jaundice    Head WDL  Ears WDL  Nose WDL  Mouth WDL  Neck WDL  Breast/Chest WDL  Shoulder Blades WDL  Spine WDL  (R) Arm/Elbow/Hand WDL  (L) Arm/Elbow/Hand WDL  Abdomen WDL  Groin WDL  Scrotum/Coccyx/Buttocks WDL  (R) Leg WDL  (L) Leg WDL  (R) Heel/Foot/Toe WDL  (L) Heel/Foot/Toe WDL          Devices In Places  None      Interventions In Place Pillows and Pressure Redistribution Mattress    Possible Skin Injury No    Pictures Uploaded Into Epic N/A  Wound Consult Placed N/A  RN Wound Prevention Protocol Ordered No

## 2023-02-16 NOTE — ANESTHESIA PREPROCEDURE EVALUATION
Case: [id number] Date/Time: 02/16/23 1315    Procedures:       ERCP (ENDOSCOPIC RETROGRADE CHOLANGIOPANCREATOGRAPHY) (Esophagus)      ERCP, WITH CALCULUS REMOVAL FROM BILE OR PANCREATIC DUCT (Esophagus)      SPHINCTEROTOMY (Esophagus)    Anesthesia type: General    Pre-op diagnosis: Choledocholithiasis    Location: [facility] ROOM 26 / SURGERY SAME DAY [facility]    Surgeons: Vito Jimenez M.D.          Relevant Problems   No relevant active problems       Physical Exam    Airway   Mallampati: II  TM distance: >3 FB  Neck ROM: full       Cardiovascular - normal exam  Rhythm: regular  Rate: normal  (-) murmur     Dental - normal exam           Pulmonary - normal exam  Breath sounds clear to auscultation     Abdominal    Neurological - normal exam                 Anesthesia Plan    ASA 1       Plan - general       Airway plan will be ETT        Plan Factors:   Patient was not previously instructed to abstain from smoking on day of procedure.  Patient did not smoke on day of procedure.      Induction: intravenous    Postoperative Plan: Postoperative administration of opioids is intended.    Pertinent diagnostic labs and testing reviewed    Informed Consent:    Anesthetic plan and risks discussed with patient.    Use of blood products discussed with: patient whom consented to blood products.

## 2023-02-16 NOTE — ASSESSMENT & PLAN NOTE
Lipid panel in January showed total cholesterol 231, , HDL 52  Hold statin at this time as patient has elevated LFTs and obstructive jaundice  Continue lifestyle modification on discharge

## 2023-02-16 NOTE — OR NURSING
1415 Pt to PACU from OR. Report from anesthesia and OR RN. On 6L O2 via mask. Respirations even and unlabored. VSS.     1445 Patient tolerating sips of water.    1501 Report given to SHANE Marin, all questions and concerns addressed at this time.    1504 [place] [address] [place].

## 2023-02-16 NOTE — PROCEDURES
Endoscopic Retrograde Cholangiopancreatography    Date of Procedure:  2/16/2023  Attending Physician:  Vito Jimenez MD  Indications: Choledocholithiasis elevated LFTs abnormal imaging    Instrument: Olympus Flexible Sideviewing Endoscope  Sedation:     Anesthesiologist/Type of Anesthesia:  Anesthesiologist: John Cotter M.D.; Michael Santamaria M.D./General    Surgical Staff:  Endoscopy Technician: Jeferson Long; Bailey Dimas  Radiology Technologist: Neel Bishop  Endoscopy Nurse: Kyle Otero R.N.    Pre-Anesthesia Assessment:  Prior to the procedure, a History and Physical was performed, and patient medications and allergies were reviewed. The patient’s tolerance of previous anesthesia was also reviewed. The risks and benefits of the procedure and the sedation options and risks were discussed with the patient including but not limited to infection, bleeding, aspiration, perforation, adverse medication reaction, missed diagnosis, missed lesions, and pancreatitis. The patient verbalized understanding. All questions were answered, and informed consent was obtained      After I obtained informed consent from the patient, the patient was placed in the prone/swimmer position. Appropriate time-out protocol was followed: the correct patient, the correct procedure, and the correct equipment in the room were confirmed. Throughout the procedure, the patient’s blood pressure, pulse, and oxygen saturations were monitored continuously. The Olymus flexible sideviewing duodenoscope was inserted through the oropharynx, esophagus intubated, then advanced to the gastrointestinal tract to the major papilla. The duct(s) were cannulated and contrast was injected I personally interpreted the ductal images.  Findings and interventions were performed and documented below. Air was then withdrawn and the duodenoscope was removed. The patient tolerated the procedure well. There were no immediate postoperative  complications    Findings:     film was normal.  Scope was inserted to second portion of duodenum with no significant difficulty but there was some laxity and accommodation of the stomach.  With abdominal pressure scope was able to be inserted to the level of the papilla.  Papilla appeared normal.  Cannulation with a 0.025 wire sphincterotome was pure.  Throughout the entire procedure pancreatic duct was never cannulated or injected.  Wire was passed proximally through the expected course into the left intrahepatic ductal system.  Aspiration confirmed bile test cholangiogram define anatomy.  There appears to be a filling defect in the distal common bile duct.  A traction biliary sphincterotomy was then performed to the edge of the transverse fold.  No significant bleeding was noted.  Utilizing a 9-12 mm extractor balloon 1 large stone was removed intact.  Stepwise occlusion cholangiogram demonstrated no extravasation contrast no filling defect.  Impacted cholangiogram demonstrated no extravasation contrast.  Excessive contrast and was swept out.  There was flow of bile.  Bilateral films under diaphragm was negative for free air.  Pancreatic duct never cannulated or injected    Impressions:   Choledocholithiasis treated by ERCP sphincterotomy balloon sweep      Recommendations:   Monitor for postprocedure complication including perforation bleeding  Indomethacin 100 mg suppository to decrease risk of post ERCP pancreatitis  Ice chips and clears okay in 4 hours if no significant pain may advance diet as tolerated n.p.o. postmidnight again depending on timing of cholecystectomy  Recommend consultation with surgical team for timing of cholecystectomy      NOTE: Radiologic interpretation of dynamic and static fluoroscopic imaging by myself.  At no time was/were a Radiologist present.     This note was generated using voice recognition software which has a small chance of producing errors of grammar and possibly  content. I have made every reasonable attempt to find and correct any obvious errors, but expect that some may not be found prior to finalization of this note

## 2023-02-16 NOTE — PROGRESS NOTES
Indication:Choledocholithiasis, abdominal pain, elevated LFT  Plan: ERCP with intervention  Risks, benefits, and alternatives were discussed with with consenting person(s). Consenting person(s) were given an opportunity to ask questions and discuss other options. Risks including but not limited to failed or incomplete ERCP, stent migration, ineffective therapy, radiation exposure, pancreatitis (with potential future complications), contrast reaction, perforation, infection, bleeding, missed lesion(s), cardiac and/or pulmonary event, aspiration, stroke, possible need for surgery, hospitalization possibly prolonged, discomfort, unsuccessful and/or incomplete procedure, possible need for repeat procedures and/or additional testings, damage to adjacent organs and/or vascular structures, medication reaction, disability, death, and other adverse events possibly life-threatening. Discussion was undertaken with Layman's terms. Consenting persons stated understanding and acceptance of these risks, and wished to proceed. Consent was given in clear state of mind.

## 2023-02-16 NOTE — ANESTHESIA POSTPROCEDURE EVALUATION
Patient: Adonis Mann    Procedure Summary     Date: 02/16/23 Room / Location: [facility] ROOM 26 / SURGERY SAME DAY [facility]    Anesthesia Start: 1344 Anesthesia Stop: 1417    Procedures:       ERCP (ENDOSCOPIC RETROGRADE CHOLANGIOPANCREATOGRAPHY) (Esophagus)      ERCP, WITH CALCULUS REMOVAL FROM BILE OR PANCREATIC DUCT (Esophagus)      SPHINCTEROTOMY (Esophagus) Diagnosis: (Choledocholithiasis, sphincterotomy)    Surgeons: Vito Jimenez M.D. Responsible Provider: Michael Santamaria M.D.    Anesthesia Type: general ASA Status: 1          Final Anesthesia Type: general  Last vitals  BP   Blood Pressure: 115/69    Temp   36.4 °C (97.5 °F)    Pulse   (!) 55   Resp   18    SpO2   95 %      Anesthesia Post Evaluation    Patient location during evaluation: PACU  Patient participation: complete - patient participated  Level of consciousness: awake and alert  Pain score: 0    Airway patency: patent  Anesthetic complications: no  Cardiovascular status: hemodynamically stable  Respiratory status: acceptable  Hydration status: euvolemic    PONV: none          No notable events documented.     Nurse Pain Score: 0 (NPRS)

## 2023-02-16 NOTE — ASSESSMENT & PLAN NOTE
T. bili 5.9  Due to obstructive choledocholithiasis  Repeat CMP, monitor  Status post ERCP 2/16  Status post laparoscopic cholecystectomy 2/17

## 2023-02-16 NOTE — ANESTHESIA TIME REPORT
Anesthesia Start and Stop Event Times     Date Time Event    2/16/2023 1331 Ready for Procedure     1344 Anesthesia Start     1417 Anesthesia Stop        Responsible Staff  02/16/23    Name Role Begin End    John Cotter M.D. Anesth 1344 1403    Michael Santamaria M.D. Anesth 1403 1417        Overtime Reason:  no overtime (within assigned shift)    Comments:

## 2023-02-17 ENCOUNTER — APPOINTMENT (OUTPATIENT)
Dept: RADIOLOGY | Facility: MEDICAL CENTER | Age: 38
DRG: 419 | End: 2023-02-17
Attending: SURGERY
Payer: COMMERCIAL

## 2023-02-17 ENCOUNTER — ANESTHESIA EVENT (OUTPATIENT)
Dept: SURGERY | Facility: MEDICAL CENTER | Age: 38
DRG: 419 | End: 2023-02-17
Payer: COMMERCIAL

## 2023-02-17 ENCOUNTER — HOSPITAL ENCOUNTER (OUTPATIENT)
Dept: RADIOLOGY | Facility: MEDICAL CENTER | Age: 38
End: 2023-02-17

## 2023-02-17 ENCOUNTER — ANESTHESIA (OUTPATIENT)
Dept: SURGERY | Facility: MEDICAL CENTER | Age: 38
DRG: 419 | End: 2023-02-17
Payer: COMMERCIAL

## 2023-02-17 PROBLEM — F12.90 MARIJUANA SMOKER: Status: ACTIVE | Noted: 2023-02-17

## 2023-02-17 LAB
ABO + RH BLD: NORMAL
ABO GROUP BLD: NORMAL
ALBUMIN SERPL BCP-MCNC: 3.7 G/DL (ref 3.2–4.9)
ALBUMIN/GLOB SERPL: 1.4 G/DL
ALP SERPL-CCNC: 308 U/L (ref 30–99)
ALT SERPL-CCNC: 187 U/L (ref 2–50)
ANION GAP SERPL CALC-SCNC: 9 MMOL/L (ref 7–16)
AST SERPL-CCNC: 62 U/L (ref 12–45)
BASOPHILS # BLD AUTO: 0.5 % (ref 0–1.8)
BASOPHILS # BLD: 0.04 K/UL (ref 0–0.12)
BILIRUB CONJ SERPL-MCNC: 0.5 MG/DL (ref 0.1–0.5)
BILIRUB INDIRECT SERPL-MCNC: 0.7 MG/DL (ref 0–1)
BILIRUB SERPL-MCNC: 1.2 MG/DL (ref 0.1–1.5)
BLD GP AB SCN SERPL QL: NORMAL
BUN SERPL-MCNC: 11 MG/DL (ref 8–22)
CALCIUM ALBUM COR SERPL-MCNC: 8.9 MG/DL (ref 8.5–10.5)
CALCIUM SERPL-MCNC: 8.7 MG/DL (ref 8.5–10.5)
CHLORIDE SERPL-SCNC: 105 MMOL/L (ref 96–112)
CO2 SERPL-SCNC: 24 MMOL/L (ref 20–33)
CREAT SERPL-MCNC: 0.85 MG/DL (ref 0.5–1.4)
EOSINOPHIL # BLD AUTO: 0.06 K/UL (ref 0–0.51)
EOSINOPHIL NFR BLD: 0.8 % (ref 0–6.9)
ERYTHROCYTE [DISTWIDTH] IN BLOOD BY AUTOMATED COUNT: 36.3 FL (ref 35.9–50)
GFR SERPLBLD CREATININE-BSD FMLA CKD-EPI: 113 ML/MIN/1.73 M 2
GLOBULIN SER CALC-MCNC: 2.7 G/DL (ref 1.9–3.5)
GLUCOSE SERPL-MCNC: 104 MG/DL (ref 65–99)
HCT VFR BLD AUTO: 40.5 % (ref 42–52)
HGB BLD-MCNC: 13.7 G/DL (ref 14–18)
IMM GRANULOCYTES # BLD AUTO: 0.04 K/UL (ref 0–0.11)
IMM GRANULOCYTES NFR BLD AUTO: 0.5 % (ref 0–0.9)
LYMPHOCYTES # BLD AUTO: 1.72 K/UL (ref 1–4.8)
LYMPHOCYTES NFR BLD: 22.2 % (ref 22–41)
MAGNESIUM SERPL-MCNC: 1.9 MG/DL (ref 1.5–2.5)
MCH RBC QN AUTO: 30.2 PG (ref 27–33)
MCHC RBC AUTO-ENTMCNC: 33.8 G/DL (ref 33.7–35.3)
MCV RBC AUTO: 89.4 FL (ref 81.4–97.8)
MONOCYTES # BLD AUTO: 0.42 K/UL (ref 0–0.85)
MONOCYTES NFR BLD AUTO: 5.4 % (ref 0–13.4)
NEUTROPHILS # BLD AUTO: 5.47 K/UL (ref 1.82–7.42)
NEUTROPHILS NFR BLD: 70.6 % (ref 44–72)
NRBC # BLD AUTO: 0 K/UL
NRBC BLD-RTO: 0 /100 WBC
PATHOLOGY CONSULT NOTE: NORMAL
PHOSPHATE SERPL-MCNC: 2.8 MG/DL (ref 2.5–4.5)
PLATELET # BLD AUTO: 210 K/UL (ref 164–446)
PMV BLD AUTO: 9.8 FL (ref 9–12.9)
POTASSIUM SERPL-SCNC: 3.9 MMOL/L (ref 3.6–5.5)
PROT SERPL-MCNC: 6.4 G/DL (ref 6–8.2)
RBC # BLD AUTO: 4.53 M/UL (ref 4.7–6.1)
RH BLD: NORMAL
SODIUM SERPL-SCNC: 138 MMOL/L (ref 135–145)
WBC # BLD AUTO: 7.8 K/UL (ref 4.8–10.8)

## 2023-02-17 PROCEDURE — 86900 BLOOD TYPING SEROLOGIC ABO: CPT

## 2023-02-17 PROCEDURE — 86850 RBC ANTIBODY SCREEN: CPT

## 2023-02-17 PROCEDURE — A9270 NON-COVERED ITEM OR SERVICE: HCPCS | Performed by: INTERNAL MEDICINE

## 2023-02-17 PROCEDURE — 700105 HCHG RX REV CODE 258: Performed by: SURGERY

## 2023-02-17 PROCEDURE — 99233 SBSQ HOSP IP/OBS HIGH 50: CPT | Performed by: INTERNAL MEDICINE

## 2023-02-17 PROCEDURE — 160002 HCHG RECOVERY MINUTES (STAT): Performed by: SURGERY

## 2023-02-17 PROCEDURE — 86901 BLOOD TYPING SEROLOGIC RH(D): CPT

## 2023-02-17 PROCEDURE — 700102 HCHG RX REV CODE 250 W/ 637 OVERRIDE(OP): Performed by: ANESTHESIOLOGY

## 2023-02-17 PROCEDURE — 84100 ASSAY OF PHOSPHORUS: CPT

## 2023-02-17 PROCEDURE — 0FT44ZZ RESECTION OF GALLBLADDER, PERCUTANEOUS ENDOSCOPIC APPROACH: ICD-10-PCS | Performed by: SURGERY

## 2023-02-17 PROCEDURE — 80053 COMPREHEN METABOLIC PANEL: CPT

## 2023-02-17 PROCEDURE — 700111 HCHG RX REV CODE 636 W/ 250 OVERRIDE (IP): Performed by: ANESTHESIOLOGY

## 2023-02-17 PROCEDURE — 00790 ANES IPER UPR ABD NOS: CPT | Performed by: ANESTHESIOLOGY

## 2023-02-17 PROCEDURE — 82248 BILIRUBIN DIRECT: CPT

## 2023-02-17 PROCEDURE — 160048 HCHG OR STATISTICAL LEVEL 1-5: Performed by: SURGERY

## 2023-02-17 PROCEDURE — 160029 HCHG SURGERY MINUTES - 1ST 30 MINS LEVEL 4: Performed by: SURGERY

## 2023-02-17 PROCEDURE — 770004 HCHG ROOM/CARE - ONCOLOGY PRIVATE *

## 2023-02-17 PROCEDURE — 700102 HCHG RX REV CODE 250 W/ 637 OVERRIDE(OP): Performed by: INTERNAL MEDICINE

## 2023-02-17 PROCEDURE — 160009 HCHG ANES TIME/MIN: Performed by: SURGERY

## 2023-02-17 PROCEDURE — 160041 HCHG SURGERY MINUTES - EA ADDL 1 MIN LEVEL 4: Performed by: SURGERY

## 2023-02-17 PROCEDURE — 700101 HCHG RX REV CODE 250: Performed by: SURGERY

## 2023-02-17 PROCEDURE — 700101 HCHG RX REV CODE 250: Performed by: ANESTHESIOLOGY

## 2023-02-17 PROCEDURE — 85025 COMPLETE CBC W/AUTO DIFF WBC: CPT

## 2023-02-17 PROCEDURE — A9270 NON-COVERED ITEM OR SERVICE: HCPCS | Performed by: ANESTHESIOLOGY

## 2023-02-17 PROCEDURE — 83735 ASSAY OF MAGNESIUM: CPT

## 2023-02-17 PROCEDURE — 76705 ECHO EXAM OF ABDOMEN: CPT

## 2023-02-17 PROCEDURE — 88304 TISSUE EXAM BY PATHOLOGIST: CPT

## 2023-02-17 PROCEDURE — 36415 COLL VENOUS BLD VENIPUNCTURE: CPT

## 2023-02-17 PROCEDURE — 160035 HCHG PACU - 1ST 60 MINS PHASE I: Performed by: SURGERY

## 2023-02-17 RX ORDER — HALOPERIDOL 5 MG/ML
1 INJECTION INTRAMUSCULAR
Status: DISCONTINUED | OUTPATIENT
Start: 2023-02-17 | End: 2023-02-17 | Stop reason: HOSPADM

## 2023-02-17 RX ORDER — OXYCODONE HCL 5 MG/5 ML
10 SOLUTION, ORAL ORAL
Status: COMPLETED | OUTPATIENT
Start: 2023-02-17 | End: 2023-02-17

## 2023-02-17 RX ORDER — ONDANSETRON 2 MG/ML
4 INJECTION INTRAMUSCULAR; INTRAVENOUS
Status: DISCONTINUED | OUTPATIENT
Start: 2023-02-17 | End: 2023-02-17 | Stop reason: HOSPADM

## 2023-02-17 RX ORDER — ROCURONIUM BROMIDE 10 MG/ML
INJECTION, SOLUTION INTRAVENOUS PRN
Status: DISCONTINUED | OUTPATIENT
Start: 2023-02-17 | End: 2023-02-17 | Stop reason: SURG

## 2023-02-17 RX ORDER — MEPERIDINE HYDROCHLORIDE 25 MG/ML
12.5 INJECTION INTRAMUSCULAR; INTRAVENOUS; SUBCUTANEOUS
Status: DISCONTINUED | OUTPATIENT
Start: 2023-02-17 | End: 2023-02-17 | Stop reason: HOSPADM

## 2023-02-17 RX ORDER — HYDRALAZINE HYDROCHLORIDE 20 MG/ML
5 INJECTION INTRAMUSCULAR; INTRAVENOUS
Status: DISCONTINUED | OUTPATIENT
Start: 2023-02-17 | End: 2023-02-17 | Stop reason: HOSPADM

## 2023-02-17 RX ORDER — SODIUM CHLORIDE, SODIUM LACTATE, POTASSIUM CHLORIDE, CALCIUM CHLORIDE 600; 310; 30; 20 MG/100ML; MG/100ML; MG/100ML; MG/100ML
INJECTION, SOLUTION INTRAVENOUS CONTINUOUS
Status: DISCONTINUED | OUTPATIENT
Start: 2023-02-17 | End: 2023-02-17

## 2023-02-17 RX ORDER — HYDROMORPHONE HYDROCHLORIDE 1 MG/ML
0.1 INJECTION, SOLUTION INTRAMUSCULAR; INTRAVENOUS; SUBCUTANEOUS
Status: DISCONTINUED | OUTPATIENT
Start: 2023-02-17 | End: 2023-02-17 | Stop reason: HOSPADM

## 2023-02-17 RX ORDER — HYDROMORPHONE HYDROCHLORIDE 1 MG/ML
0.4 INJECTION, SOLUTION INTRAMUSCULAR; INTRAVENOUS; SUBCUTANEOUS
Status: DISCONTINUED | OUTPATIENT
Start: 2023-02-17 | End: 2023-02-17 | Stop reason: HOSPADM

## 2023-02-17 RX ORDER — NEOSTIGMINE METHYLSULFATE 1 MG/ML
INJECTION, SOLUTION INTRAVENOUS PRN
Status: DISCONTINUED | OUTPATIENT
Start: 2023-02-17 | End: 2023-02-17 | Stop reason: SURG

## 2023-02-17 RX ORDER — HYDROMORPHONE HYDROCHLORIDE 1 MG/ML
0.2 INJECTION, SOLUTION INTRAMUSCULAR; INTRAVENOUS; SUBCUTANEOUS
Status: DISCONTINUED | OUTPATIENT
Start: 2023-02-17 | End: 2023-02-17 | Stop reason: HOSPADM

## 2023-02-17 RX ORDER — SODIUM CHLORIDE, SODIUM LACTATE, POTASSIUM CHLORIDE, CALCIUM CHLORIDE 600; 310; 30; 20 MG/100ML; MG/100ML; MG/100ML; MG/100ML
INJECTION, SOLUTION INTRAVENOUS CONTINUOUS
Status: DISCONTINUED | OUTPATIENT
Start: 2023-02-17 | End: 2023-02-17 | Stop reason: HOSPADM

## 2023-02-17 RX ORDER — CEFOTETAN DISODIUM 2 G/20ML
INJECTION, POWDER, FOR SOLUTION INTRAMUSCULAR; INTRAVENOUS PRN
Status: DISCONTINUED | OUTPATIENT
Start: 2023-02-17 | End: 2023-02-17 | Stop reason: SURG

## 2023-02-17 RX ORDER — IPRATROPIUM BROMIDE AND ALBUTEROL SULFATE 2.5; .5 MG/3ML; MG/3ML
3 SOLUTION RESPIRATORY (INHALATION)
Status: DISCONTINUED | OUTPATIENT
Start: 2023-02-17 | End: 2023-02-17 | Stop reason: HOSPADM

## 2023-02-17 RX ORDER — DIPHENHYDRAMINE HYDROCHLORIDE 50 MG/ML
12.5 INJECTION INTRAMUSCULAR; INTRAVENOUS
Status: DISCONTINUED | OUTPATIENT
Start: 2023-02-17 | End: 2023-02-17 | Stop reason: HOSPADM

## 2023-02-17 RX ORDER — BUPIVACAINE HYDROCHLORIDE AND EPINEPHRINE 5; 5 MG/ML; UG/ML
INJECTION, SOLUTION EPIDURAL; INTRACAUDAL; PERINEURAL
Status: DISCONTINUED | OUTPATIENT
Start: 2023-02-17 | End: 2023-02-17 | Stop reason: HOSPADM

## 2023-02-17 RX ORDER — KETOROLAC TROMETHAMINE 30 MG/ML
INJECTION, SOLUTION INTRAMUSCULAR; INTRAVENOUS PRN
Status: DISCONTINUED | OUTPATIENT
Start: 2023-02-17 | End: 2023-02-17 | Stop reason: SURG

## 2023-02-17 RX ORDER — LIDOCAINE HYDROCHLORIDE 20 MG/ML
INJECTION, SOLUTION EPIDURAL; INFILTRATION; INTRACAUDAL; PERINEURAL PRN
Status: DISCONTINUED | OUTPATIENT
Start: 2023-02-17 | End: 2023-02-17 | Stop reason: SURG

## 2023-02-17 RX ORDER — OXYCODONE HCL 5 MG/5 ML
5 SOLUTION, ORAL ORAL
Status: COMPLETED | OUTPATIENT
Start: 2023-02-17 | End: 2023-02-17

## 2023-02-17 RX ORDER — LANOLIN ALCOHOL/MO/W.PET/CERES
400 CREAM (GRAM) TOPICAL 2 TIMES DAILY
Status: COMPLETED | OUTPATIENT
Start: 2023-02-17 | End: 2023-02-18

## 2023-02-17 RX ORDER — GLYCOPYRROLATE 0.2 MG/ML
INJECTION INTRAMUSCULAR; INTRAVENOUS PRN
Status: DISCONTINUED | OUTPATIENT
Start: 2023-02-17 | End: 2023-02-17 | Stop reason: SURG

## 2023-02-17 RX ORDER — MIDAZOLAM HYDROCHLORIDE 1 MG/ML
1 INJECTION INTRAMUSCULAR; INTRAVENOUS
Status: DISCONTINUED | OUTPATIENT
Start: 2023-02-17 | End: 2023-02-17 | Stop reason: HOSPADM

## 2023-02-17 RX ADMIN — KETOROLAC TROMETHAMINE 30 MG: 30 INJECTION, SOLUTION INTRAMUSCULAR at 09:14

## 2023-02-17 RX ADMIN — SODIUM CHLORIDE, POTASSIUM CHLORIDE, SODIUM LACTATE AND CALCIUM CHLORIDE 1000 ML: 600; 310; 30; 20 INJECTION, SOLUTION INTRAVENOUS at 00:28

## 2023-02-17 RX ADMIN — OXYCODONE 5 MG: 5 TABLET ORAL at 23:45

## 2023-02-17 RX ADMIN — FENTANYL CITRATE 100 MCG: 50 INJECTION, SOLUTION INTRAMUSCULAR; INTRAVENOUS at 09:14

## 2023-02-17 RX ADMIN — SENNOSIDES AND DOCUSATE SODIUM 2 TABLET: 50; 8.6 TABLET ORAL at 05:47

## 2023-02-17 RX ADMIN — ROCURONIUM BROMIDE 50 MG: 10 INJECTION, SOLUTION INTRAVENOUS at 08:22

## 2023-02-17 RX ADMIN — MEPERIDINE HYDROCHLORIDE 12.5 MG: 25 INJECTION INTRAMUSCULAR; INTRAVENOUS; SUBCUTANEOUS at 09:46

## 2023-02-17 RX ADMIN — FENTANYL CITRATE 100 MCG: 50 INJECTION, SOLUTION INTRAMUSCULAR; INTRAVENOUS at 09:34

## 2023-02-17 RX ADMIN — LIDOCAINE HYDROCHLORIDE 100 MG: 20 INJECTION, SOLUTION EPIDURAL; INFILTRATION; INTRACAUDAL at 08:22

## 2023-02-17 RX ADMIN — OXYCODONE HYDROCHLORIDE 10 MG: 5 SOLUTION ORAL at 10:08

## 2023-02-17 RX ADMIN — SODIUM CHLORIDE, POTASSIUM CHLORIDE, SODIUM LACTATE AND CALCIUM CHLORIDE: 600; 310; 30; 20 INJECTION, SOLUTION INTRAVENOUS at 09:11

## 2023-02-17 RX ADMIN — GLYCOPYRROLATE 0.4 MG: 0.2 INJECTION INTRAMUSCULAR; INTRAVENOUS at 09:28

## 2023-02-17 RX ADMIN — OMEPRAZOLE 20 MG: 20 CAPSULE, DELAYED RELEASE ORAL at 17:56

## 2023-02-17 RX ADMIN — MEPERIDINE HYDROCHLORIDE 12.5 MG: 25 INJECTION INTRAMUSCULAR; INTRAVENOUS; SUBCUTANEOUS at 09:41

## 2023-02-17 RX ADMIN — PROPOFOL 100 MG: 10 INJECTION, EMULSION INTRAVENOUS at 08:24

## 2023-02-17 RX ADMIN — PROPOFOL 200 MG: 10 INJECTION, EMULSION INTRAVENOUS at 08:22

## 2023-02-17 RX ADMIN — Medication 400 MG: at 19:26

## 2023-02-17 RX ADMIN — NEOSTIGMINE METHYLSULFATE 3 MG: 1 INJECTION INTRAVENOUS at 09:28

## 2023-02-17 RX ADMIN — DULOXETINE HYDROCHLORIDE 60 MG: 20 CAPSULE, DELAYED RELEASE ORAL at 05:47

## 2023-02-17 RX ADMIN — OMEPRAZOLE 20 MG: 20 CAPSULE, DELAYED RELEASE ORAL at 05:47

## 2023-02-17 RX ADMIN — SENNOSIDES AND DOCUSATE SODIUM 2 TABLET: 50; 8.6 TABLET ORAL at 17:56

## 2023-02-17 RX ADMIN — FENTANYL CITRATE 100 MCG: 50 INJECTION, SOLUTION INTRAMUSCULAR; INTRAVENOUS at 08:23

## 2023-02-17 RX ADMIN — FENTANYL CITRATE 50 MCG: 50 INJECTION, SOLUTION INTRAMUSCULAR; INTRAVENOUS at 09:28

## 2023-02-17 RX ADMIN — CEFOTETAN DISODIUM 2 G: 2 INJECTION, POWDER, FOR SOLUTION INTRAMUSCULAR; INTRAVENOUS at 08:31

## 2023-02-17 RX ADMIN — FENTANYL CITRATE 150 MCG: 50 INJECTION, SOLUTION INTRAMUSCULAR; INTRAVENOUS at 08:56

## 2023-02-17 RX ADMIN — DIBASIC SODIUM PHOSPHATE, MONOBASIC POTASSIUM PHOSPHATE AND MONOBASIC SODIUM PHOSPHATE 500 MG: 852; 155; 130 TABLET ORAL at 19:25

## 2023-02-17 ASSESSMENT — ENCOUNTER SYMPTOMS
FEVER: 0
SORE THROAT: 0
CHILLS: 0
SHORTNESS OF BREATH: 0
WEAKNESS: 0
VOMITING: 1
VOMITING: 0
DEPRESSION: 0
BLOOD IN STOOL: 0
NERVOUS/ANXIOUS: 0
COUGH: 0
DEPRESSION: 1
FALLS: 0
DIZZINESS: 0
EYE PAIN: 0
NAUSEA: 1
CONSTIPATION: 0
PALPITATIONS: 0
BACK PAIN: 0
ABDOMINAL PAIN: 1
HEADACHES: 0
DIARRHEA: 0
NAUSEA: 0
BLURRED VISION: 0
HEARTBURN: 0
ABDOMINAL PAIN: 0

## 2023-02-17 ASSESSMENT — PAIN DESCRIPTION - PAIN TYPE
TYPE: SURGICAL PAIN
TYPE: SURGICAL PAIN
TYPE: ACUTE PAIN
TYPE: ACUTE PAIN
TYPE: SURGICAL PAIN

## 2023-02-17 NOTE — CONSULTS
CHIEF COMPLAINT: choledocholithiasis     HISTORY OF PRESENT ILLNESS: The patient is a 37 year-old White man who presented to the Emergency Department a one- day history of severe epigastric and right upper quadrant  abdominal pain on 2/15. The pain is associated with nausea, anorexia, and jaundice . He denies any food intolerance or temporal relationship between symptoms and eating. He denies a family history of gallstones. The patient denies any recent or intercurrent illness. The patient denies any recent or intercurrent illness. The patient denies any history of previous abdominal surgery. At the time of admission he was noted to have elevated LFTs with the Tbili of 5.9. MRCP demonstrated a 9 mm common duct stone and he underwent ERCP, sphincterotomy, and stent placement today. ACS was consulted for interval cholecystectomy. At current he denies abdominal pain, nausea, vomiting, dark urine, or acholic stool.    PAST MEDICAL HISTORY:  has a past medical history of Anxiety and Depression.    PAST SURGICAL HISTORY:  has a past surgical history that includes open reduction; pr ercp,diagnostic (N/A, 2/16/2023); pr ercp,w/removal stone,jeff/pancr ducts (N/A, 2/16/2023); and sphincterotomy (N/A, 2/16/2023).    ALLERGIES: No Known Allergies    CURRENT MEDICATIONS:    Home Medications       Reviewed by Mateusz Swartz R.N. (Registered Nurse) on 02/16/23 at 0124  Med List Status: Complete     Medication Last Dose Status   clonazePAM (KLONOPIN) 1 MG Tab 2/15/2023 Active   DULoxetine (CYMBALTA) 30 MG Cap DR Particles 2/15/2023 Active   omeprazole (PRILOSEC) 40 MG delayed-release capsule 2/12/2023 Active                    FAMILY HISTORY: family history includes Depression in his father and another family member.    SOCIAL HISTORY:  reports that he quit smoking about 6 weeks ago. His smoking use included cigarettes. He smoked an average of .5 packs per day. He has never used smokeless tobacco. He reports current drug  "use. Drug: Marijuana. He reports that he does not drink alcohol.    REVIEW OF SYSTEMS: Comprehensive review of systems is negative with the exception of the aforementioned HPI, PMH, and PSH bullets in accordance with CMS guidelines.    PHYSICAL EXAMINATION:      Constitutional:     Vital Signs: /72   Pulse 69   Temp 36.5 °C (97.7 °F) (Temporal)   Resp 16   Ht 1.778 m (5' 10\")   Wt 81.6 kg (180 lb)   SpO2 97%    General Appearance: appears stated age, is in no apparent distress.  HEENT: The pupils are equal, round, and reactive to light bilaterally. The extraocular muscles are intact bilaterally. The sclera are icteric. Nares and oropharynx are clear.   Neck: Supple. No adenopathy.  Respiratory:   Inspection: Unlabored respirations, no intercostal retractions, paradoxical motion, or accessory muscle use.   Auscultation: normal.  Cardiovascular:   Inspection: The skin is warm and well purfused.  Auscultation: Regular rate and rhythm.   Peripheral Pulses: Normal.   Abdomen:  Inspection: Abdominal inspection reveals no abrasions, contusions, lacerations or penetrating wounds.   Palpation: Palpation is remarkable for no significant tenderness, guarding, or peritoneal findings. No abdominal wall hernias.  Extremities:   Examination of the upper and lower extremities demonstrates no cyanosis edema or clubbing.  Neurologic:   Alert & oriented to person, time and place. Normal motor function. Normal sensory function. No focal deficits noted.    LABORATORY VALUES:   Recent Labs     02/15/23  0000 02/16/23  0028   WBC 5.3 5.1   RBC 4.87 4.42*   HEMOGLOBIN 14.9 13.6*   HEMATOCRIT 43.0 39.9*   MCV 88.3 90.3   MCH 30.6 30.8   MCHC 34.7 34.1   RDW 11.6 38.3   PLATELETCT 202 197   MPV 9.4 9.7     Recent Labs     02/15/23  0000 02/16/23  0028   SODIUM 138 139   POTASSIUM 3.7 3.9   CHLORIDE 107 106   CO2 20* 22   GLUCOSE 97 79   BUN 11 11   CREATININE 0.8 0.86   CALCIUM 9.3 9.4     Recent Labs     02/15/23  0000 " 02/16/23  0028   ASTSGOT 231* 126*   ALTSGPT 451* 303*   TBILIRUBIN 5.9* 3.5*   IBILIRUBIN 1.3*  --    DBILIRUBIN 4.6*  --    ALKPHOSPHAT 325* 342*   GLOBULIN  --  2.7   INR 0.99  --      Recent Labs     02/15/23  0000   INR 0.99        IMAGING:   DX-PORTABLE FLUOROSCOPY < 1 HOUR   Final Result      Portable intraoperative imaging with findings as described above.      US-RUQ    (Results Pending)       ASSESSMENT AND PLAN:     * Choledocholithiasis- (present on admission)  Assessment & Plan  Presented with 1 day history of abdominal pain and yellowing skin.  Elevated LFTs, T bili and alk phos.  MRCP with Choledocholithiasis and a 9 mm stone noted within the distal common bile duct.  2/16 ERCP with sphincterotomy and balloon sweep.  Lap cholecystectomy likely 2/17.  [facility].        The patient has choledocholithiasis. Plan: Laparoscopic cholecystectomy.    The patient will be taken to the operating room for Laparoscopic cholecystectomy. The surgical conduct was discussed in detail. Potential complications including, but not limited to, infection, bleeding, damage to adjacent structures, bile duct injury, need to convert to an open procedure, and anesthetic complications were discussed. Questions were elicited and answered to the patient's satisfaction.  Operative consent signed.      ____________________________________     Bubba Lemons M.D.    DD: 2/16/2023  4:24 PM    [facility] Guidelines for Acute Cholecystitis 2018  ACS NSQIP Surgical Risk Calculator

## 2023-02-17 NOTE — OP REPORT
DATE OF OPERATION: 2/17/2023    PREOPERATIVE DIAGNOSIS: Choledocholithiasis    POSTOPERATIVE DIAGNOSIS: Same.    PROCEDURE PERFORMED: Laparoscopic cholecystectomy.     SURGEON: Elpidio Briggs MD    ASSISTANT: Ivis Bishop PA-C.    The indications for a surgical assistant in this surgery were indicated due to complexity of the procedure. Their role included aiding in incision, retraction, holding devices including camera for laparoscopic procedure, and closure of the wound.     ANESTHESIA: General endotracheal anesthesia.    FINDINGS: The gallbladder showed signs of mild chronic inflammation. Anterior and posterior cystic arterial branches.    SPECIMEN: Gallbladder with contents.    ESTIMATED BLOOD LOSS: 5 mL.    PROCEDURE: Informed consent was obtained pre-operatively.  The patient was taken back to the operating room and placed in supine position.  General endotracheal anesthesia was administered and the patient was intubated. Intravenous antibiotics were administered by the anesthesiologist in correct time interval. Sequential compression devices were placed. The abdomen was prepped and draped in a sterile fashion.  A time-out was performed and the patient and procedure were both verified.      Marcaine 0.5% with epinephrine was used to infiltrate the port sites. A 5 mm fiorella-umbilical incision was made and subcutaneous tissue spread bluntly. The umbilical stalk was grasped with a Kocher and elevated toward the ceiling.  A Veress needle was atraumatically inserted into the peritoneal space. Saline test was performed and supported proper intra-peritoneal placement.  Carbon dioxide gas was applied to the port and pneumoperitoneum was achieved.  The Veress needle was removed after adequate insufflation.  A 5 mm port was placed into the intra-peritoneal space. A laparoscope was placed through this port.  The abdominal contents were inspected noted to be free of injury from Veress needle and port placement.  A 12  mm and two 5 mm ports were then placed in the epigastric region, right subcostal, and right lateral locations under directed visualization, respectively.      The gallbladder was identified, elevated, and retracted cephalad over the liver edge by the fundus to expose the infundibulum. Dissection was carried out within the hepatocystic triangle, definitively identifying the cystic duct and cystic artery. The cystic duct could be seen clearly terminating at the infundibulum.  The critical view of safety was achieved.      The cystic duct and artery were each clipped once proximally, twice distally, and divided. The gallbladder was dissected free from the undersurface of the liver using electrocautery and placed within an EndoCatch bag. The gallbladder was delivered intact from the abdominal cavity through the epigastric port site and submitted for pathology. The gallbladder fossa was inspected and hemostasis was noted to be satisfactory.     The epigastric port site fascia was closed with a 0 Vicryl suture using a suture passer. Once tied down, the fascia was noted to be tight and well approximated.    The ports were opened and the abdomen was allowed to deflate. All ports were then removed.  The port site skin incisions were closed with interrupted 4-0 Vicryl subcuticular sutures.    The patient tolerated the procedure well and there were no apparent complications. All sponge, sharps, and instrument counts were correct on 2 separate occasions. The patient was awakened, extubated, and transferred to the PACU in satisfactory condition.               NSQIP Post-Operative Data:    Emergency Case?----- No   Wound Classification?----- Clean Contaminated  Wound Closure?----- All Layers  ASA Classification?----- II   E    ____________________________________   Elpidio Briggs MD, FACS    JRU / NTS     DD: 2/17/2023   DT: 9:21 AM

## 2023-02-17 NOTE — ASSESSMENT & PLAN NOTE
Presented with 1 day history of abdominal pain and yellowing skin.  Elevated LFTs, T bili and alk phos.  MRCP with Choledocholithiasis and a 9 mm stone noted within the distal common bile duct.  2/16 ERCP with sphincterotomy and balloon sweep.  2/17 Lap cholecystectomy.  [facility].

## 2023-02-17 NOTE — CARE PLAN
The patient is Stable - Low risk of patient condition declining or worsening    Shift Goals  Clinical Goals: pain control, NPO for procedure  Patient Goals: rest    Progress made toward(s) clinical / shift goals:    Problem: Psychosocial  Goal: Patient's level of anxiety will decrease  Outcome: Progressing     Problem: Pain - Standard  Goal: Alleviation of pain or a reduction in pain to the patient’s comfort goal  Outcome: Progressing       Patient is not progressing towards the following goals:

## 2023-02-17 NOTE — ANESTHESIA TIME REPORT
Anesthesia Start and Stop Event Times     Date Time Event    2/17/2023 0811 Ready for Procedure     0815 Anesthesia Start     0941 Anesthesia Stop        Responsible Staff  02/17/23    Name Role Begin End    Elio Chilel M.D. Anesth 0815 0941        Overtime Reason:  no overtime (within assigned shift)    Comments:

## 2023-02-17 NOTE — ANESTHESIA POSTPROCEDURE EVALUATION
Patient: Adonis Mann    Procedure Summary     Date: 02/17/23 Room / Location: [place] [address] / SURGERY [facility]    Anesthesia Start: 0815 Anesthesia Stop: 0941    Procedure: CHOLECYSTECTOMY, LAPAROSCOPIC (Abdomen) Diagnosis: (Choledocholithiasis )    Surgeons: Alvin Briggs M.D. Responsible Provider: Elio Chilel M.D.    Anesthesia Type: general ASA Status: 2 - Emergent          Final Anesthesia Type: general  Last vitals  BP   Blood Pressure: 109/69    Temp   36.3 °C (97.4 °F)    Pulse   (!) 50   Resp   14    SpO2   98 %      Anesthesia Post Evaluation    Patient location during evaluation: PACU  Patient participation: complete - patient participated  Level of consciousness: awake and alert    Airway patency: patent  Anesthetic complications: no  Cardiovascular status: hemodynamically stable  Respiratory status: acceptable  Hydration status: euvolemic    PONV: none          No notable events documented.     Nurse Pain Score: 0 (NPRS)

## 2023-02-17 NOTE — OR NURSING
0937- Pt arrives to PACU from OR on 6L of oxygen via mask. Report received. Pt denies pain, medicated for shivering upon arrival. 4 lap sites to abd [facility].    0962- Pt wife Nancy called and updated on POC and pt status.

## 2023-02-17 NOTE — PROGRESS NOTES
[facility] Medicine Daily Progress Note    Date of Service  2/17/2023    Chief Complaint  Adonis Mann is a 37 y.o. male admitted 2/15/2023 with abdominal pain, nausea, vomiting, jaundice    Hospital Course  No notes on file    Mr. Adonis Mann is a 37 y.o. male who presented on 2/15/2023 with abdominal pain, nausea, vomiting, jaundice.  Initially presented to [facility].  An MRCP showed a 9 mm distal common bile duct stone.  LFTs were concerning for choledocholithiasis and patient was transferred to [facility] for GI evaluation.    Bilirubin initially 5.9.  Lipase 52.    Status post ERCP with sphincterotomy balloon sweep on 2/16.    Right upper quadrant ultrasound showed gallbladder wall thickening, contracted    Status post laparoscopic cholecystectomy 2/17.    Interval Problem Update  Patient was seen and examined at bedside.  I have personally reviewed and interpreted vitals, labs, and imaging.    2/16.  Afebrile.  Has been bradycardic.  On room air.  Replete magnesium, potassium.  Denies fevers, chills, chest pains, shortness of breath.  Abelardo pain currently resolved.  Plan for ERCP this afternoon.  He does not appear significantly jaundiced but does report generalized itching.  2/17.  Afebrile.  Intermittent bradycardia.  On room air.  Evaluated after cholecystectomy.  Denies fever, chills, chest pain, shortness of breath, abdominal pain.  Awaiting lunch.  Discussed with general surgery and GI.  Plan to monitor overnight for any complications.  Trend LFTs.  Monitor diet and return of bowel function.    I have discussed this patient's plan of care and discharge plan at IDT rounds today with Case Management, Nursing, Nursing leadership, and other members of the IDT team.    Consultants/Specialty  general surgery and GI    Code Status  Full Code    Disposition  Patient is not medically cleared for discharge.   Anticipate discharge to to home with close outpatient follow-up.  I have placed the  appropriate orders for post-discharge needs.    Review of Systems  Review of Systems   Constitutional:  Negative for chills and fever.   HENT:  Negative for congestion and sore throat.    Eyes:  Negative for blurred vision.   Respiratory:  Negative for cough and shortness of breath.    Cardiovascular:  Negative for chest pain, palpitations and leg swelling.   Gastrointestinal:  Positive for abdominal pain, nausea and vomiting. Negative for constipation and diarrhea.   Genitourinary:  Negative for dysuria, frequency and urgency.   Musculoskeletal:  Negative for falls.   Skin:  Positive for itching. Negative for rash.   Neurological:  Negative for dizziness, weakness and headaches.   Psychiatric/Behavioral:  Negative for depression. The patient is not nervous/anxious.    All other systems reviewed and are negative.     Physical Exam  Temp:  [36.1 °C (97 °F)-36.5 °C (97.7 °F)] 36.1 °C (97 °F)  Pulse:  [42-90] 90  Resp:  [12-20] 20  BP: ()/(64-82) 148/82  SpO2:  [95 %-100 %] 98 %    Physical Exam  Vitals and nursing note reviewed.   Constitutional:       Appearance: Normal appearance. He is ill-appearing.   HENT:      Head: Normocephalic and atraumatic.      Nose: Nose normal.      Mouth/Throat:      Mouth: Mucous membranes are moist.      Pharynx: Oropharynx is clear.   Eyes:      Extraocular Movements: Extraocular movements intact.      Conjunctiva/sclera: Conjunctivae normal.   Cardiovascular:      Rate and Rhythm: Regular rhythm. Bradycardia present.      Pulses: Normal pulses.      Heart sounds: Normal heart sounds. No murmur heard.    No friction rub. No gallop.   Pulmonary:      Effort: Pulmonary effort is normal. No respiratory distress.      Breath sounds: Normal breath sounds. No wheezing or rales.   Chest:      Chest wall: No tenderness.   Abdominal:      General: Abdomen is flat. Bowel sounds are normal. There is no distension.      Palpations: Abdomen is soft. There is no mass.      Tenderness: There  is abdominal tenderness. There is no guarding.   Musculoskeletal:         General: Normal range of motion.      Cervical back: Normal range of motion and neck supple.   Skin:     General: Skin is warm.      Capillary Refill: Capillary refill takes less than 2 seconds.   Neurological:      General: No focal deficit present.      Mental Status: He is alert and oriented to person, place, and time. Mental status is at baseline.      Cranial Nerves: No cranial nerve deficit.      Motor: No weakness.   Psychiatric:         Mood and Affect: Mood normal.         Behavior: Behavior normal.       Fluids    Intake/Output Summary (Last 24 hours) at 2/17/2023 1006  Last data filed at 2/17/2023 0940  Gross per 24 hour   Intake 2230 ml   Output 28 ml   Net 2202 ml       Laboratory  Recent Labs     02/15/23  0000 02/16/23  0028   WBC 5.3 5.1   RBC 4.87 4.42*   HEMOGLOBIN 14.9 13.6*   HEMATOCRIT 43.0 39.9*   MCV 88.3 90.3   MCH 30.6 30.8   MCHC 34.7 34.1   RDW 11.6 38.3   PLATELETCT 202 197   MPV 9.4 9.7       Recent Labs     02/15/23  0000 02/16/23  0028   SODIUM 138 139   POTASSIUM 3.7 3.9   CHLORIDE 107 106   CO2 20* 22   GLUCOSE 97 79   BUN 11 11   CREATININE 0.8 0.86   CALCIUM 9.3 9.4       Recent Labs     02/15/23  0000   INR 0.99                 Imaging  US-RUQ   Final Result         1.  Gallbladder wall thickening, the gallbladder is contracted, and wall thickening with contracted gallbladder is nonspecific but not typical of cholecystitis.      DX-PORTABLE FLUOROSCOPY < 1 HOUR   Final Result      Portable intraoperative imaging with findings as described above.             Assessment/Plan  * Choledocholithiasis- (present on admission)  Assessment & Plan  Patient's outside labs confirm obstructive jaundice due to choledocholithiasis, MRCP showed 9 mm distal common bile duct stone  - I have reached out to [facility] Dr. Kauffman through [employer], she is aware and will evaluate patient in the morning  - Keep patient n.p.o. after  midnight, cleared liquid diet at the moment  - Pain regimen in place  - Hold anticoagulation  Status post ERCP with sphincterotomy balloon sweep 2/16  Status post laparoscopic cholecystectomy 2/17    Hyperbilirubinemia- (present on admission)  Assessment & Plan  T. bili 5.9  Due to obstructive choledocholithiasis  Repeat CMP, monitor  Status post ERCP 2/16  Status post laparoscopic cholecystectomy 2/17    Hyperlipidemia- (present on admission)  Assessment & Plan  Lipid panel in January showed total cholesterol 231, , HDL 52  Hold statin at this time as patient has elevated LFTs and obstructive jaundice  Continue lifestyle modification on discharge    Depression- (present on admission)  Assessment & Plan  Continue home Cymbalta    Controlled substance agreement signed: BFM, clonazepam, CSA/UDS, June 2022- (present on admission)  Assessment & Plan  Continue chronic Klonopin         VTE prophylaxis: SCDs/TEDs hold anticoagulation with plan for ERCP and cholecystectomy    I have performed a physical exam and reviewed and updated ROS and Plan today (2/17/2023). In review of yesterday's note (2/16/2023), there are no changes except as documented above.

## 2023-02-17 NOTE — ANESTHESIA PROCEDURE NOTES
Airway    Date/Time: 2/17/2023 8:24 AM  Performed by: Elio Chilel M.D.  Authorized by: Elio Chilel M.D.     Location:  OR  Urgency:  Elective  Difficult Airway: No    Indications for Airway Management:  Anesthesia      Spontaneous Ventilation: absent    Sedation Level:  Deep  Preoxygenated: Yes    Patient Position:  Sniffing  Mask Difficulty Assessment:  1 - vent by mask  Final Airway Type:  Endotracheal airway  Final Endotracheal Airway:  ETT  Cuffed: Yes    Technique Used for Successful ETT Placement:  Direct laryngoscopy    Insertion Site:  Oral  Blade Type:  [place]  Laryngoscope Blade/Videolaryngoscope Blade Size:  3  ETT Size (mm):  8.0  Measured from:  Teeth  ETT to Teeth (cm):  24  Placement Verified by: auscultation and capnometry    Cormack-Lehane Classification:  Grade I - full view of glottis  Number of Attempts at Approach:  1

## 2023-02-17 NOTE — PROGRESS NOTES
Given the above presentation, the patient will be taken to the operating room for laparoscopic, possible open cholecystectomy. The surgical plan rationale for surgery was discussed in detail and in layman's terms with the patient and available family. Potential complications were discussed in detail and include but are not limited to infection, bleeding, damage to adjacent tissues and organs, pneumonia, anesthetic complications and death on very rare occasions. Questions and concerns were addressed to the patient's and available family's apparent satisfaction.  It was agreed to proceed.  Operative consent was obtained.    Elpidio Briggs MD, FACS

## 2023-02-17 NOTE — ANESTHESIA PREPROCEDURE EVALUATION
Case: [id number] Date/Time: 02/17/23 0812    Procedure: CHOLECYSTECTOMY, LAPAROSCOPIC    Location: [facility] OR 08 / SURGERY [facility]    Surgeons: Alvin Briggs M.D.          Relevant Problems   Other   (positive) Choledocholithiasis   (positive) Hyperbilirubinemia   (positive) Major depressive disorder, recurrent episode (HCC)   (positive) Marijuana smoker       Physical Exam    Airway   Mallampati: II  TM distance: >3 FB  Neck ROM: full       Cardiovascular - normal exam  Rhythm: regular  Rate: normal  (-) murmur     Dental - normal exam        Facial Hair   Pulmonary - normal exam  Breath sounds clear to auscultation     Abdominal    Neurological - normal exam                 Anesthesia Plan    ASA 2- EMERGENT   ASA physical status emergent criteria: acutely contaminated wound or identified infection source    Plan - general       Airway plan will be ETT          Induction: intravenous    Postoperative Plan: Postoperative administration of opioids is intended.    Pertinent diagnostic labs and testing reviewed    Informed Consent:    Anesthetic plan and risks discussed with patient.    Use of blood products discussed with: patient whom consented to blood products.

## 2023-02-18 VITALS
HEART RATE: 60 BPM | RESPIRATION RATE: 18 BRPM | DIASTOLIC BLOOD PRESSURE: 82 MMHG | WEIGHT: 180 LBS | SYSTOLIC BLOOD PRESSURE: 118 MMHG | OXYGEN SATURATION: 98 % | BODY MASS INDEX: 25.77 KG/M2 | HEIGHT: 70 IN | TEMPERATURE: 98 F

## 2023-02-18 PROBLEM — K80.50 CHOLEDOCHOLITHIASIS: Status: RESOLVED | Noted: 2023-02-15 | Resolved: 2023-02-18

## 2023-02-18 LAB
ALBUMIN SERPL BCP-MCNC: 3.7 G/DL (ref 3.2–4.9)
ALBUMIN/GLOB SERPL: 1.2 G/DL
ALP SERPL-CCNC: 280 U/L (ref 30–99)
ALT SERPL-CCNC: 161 U/L (ref 2–50)
ANION GAP SERPL CALC-SCNC: 9 MMOL/L (ref 7–16)
AST SERPL-CCNC: 58 U/L (ref 12–45)
BASOPHILS # BLD AUTO: 0.7 % (ref 0–1.8)
BASOPHILS # BLD: 0.05 K/UL (ref 0–0.12)
BILIRUB SERPL-MCNC: 0.8 MG/DL (ref 0.1–1.5)
BUN SERPL-MCNC: 11 MG/DL (ref 8–22)
CALCIUM ALBUM COR SERPL-MCNC: 9.1 MG/DL (ref 8.5–10.5)
CALCIUM SERPL-MCNC: 8.9 MG/DL (ref 8.5–10.5)
CHLORIDE SERPL-SCNC: 107 MMOL/L (ref 96–112)
CO2 SERPL-SCNC: 25 MMOL/L (ref 20–33)
CREAT SERPL-MCNC: 0.73 MG/DL (ref 0.5–1.4)
EOSINOPHIL # BLD AUTO: 0.14 K/UL (ref 0–0.51)
EOSINOPHIL NFR BLD: 1.9 % (ref 0–6.9)
ERYTHROCYTE [DISTWIDTH] IN BLOOD BY AUTOMATED COUNT: 36.9 FL (ref 35.9–50)
GFR SERPLBLD CREATININE-BSD FMLA CKD-EPI: 119 ML/MIN/1.73 M 2
GLOBULIN SER CALC-MCNC: 3 G/DL (ref 1.9–3.5)
GLUCOSE SERPL-MCNC: 104 MG/DL (ref 65–99)
HCT VFR BLD AUTO: 39.4 % (ref 42–52)
HGB BLD-MCNC: 13.3 G/DL (ref 14–18)
IMM GRANULOCYTES # BLD AUTO: 0.02 K/UL (ref 0–0.11)
IMM GRANULOCYTES NFR BLD AUTO: 0.3 % (ref 0–0.9)
LIPASE SERPL-CCNC: 22 U/L (ref 11–82)
LYMPHOCYTES # BLD AUTO: 2.33 K/UL (ref 1–4.8)
LYMPHOCYTES NFR BLD: 30.9 % (ref 22–41)
MAGNESIUM SERPL-MCNC: 2 MG/DL (ref 1.5–2.5)
MCH RBC QN AUTO: 30.1 PG (ref 27–33)
MCHC RBC AUTO-ENTMCNC: 33.8 G/DL (ref 33.7–35.3)
MCV RBC AUTO: 89.1 FL (ref 81.4–97.8)
MONOCYTES # BLD AUTO: 0.58 K/UL (ref 0–0.85)
MONOCYTES NFR BLD AUTO: 7.7 % (ref 0–13.4)
NEUTROPHILS # BLD AUTO: 4.41 K/UL (ref 1.82–7.42)
NEUTROPHILS NFR BLD: 58.5 % (ref 44–72)
NRBC # BLD AUTO: 0 K/UL
NRBC BLD-RTO: 0 /100 WBC
PHOSPHATE SERPL-MCNC: 3.7 MG/DL (ref 2.5–4.5)
PLATELET # BLD AUTO: 218 K/UL (ref 164–446)
PMV BLD AUTO: 9.7 FL (ref 9–12.9)
POTASSIUM SERPL-SCNC: 4 MMOL/L (ref 3.6–5.5)
PROT SERPL-MCNC: 6.7 G/DL (ref 6–8.2)
RBC # BLD AUTO: 4.42 M/UL (ref 4.7–6.1)
SODIUM SERPL-SCNC: 141 MMOL/L (ref 135–145)
WBC # BLD AUTO: 7.5 K/UL (ref 4.8–10.8)

## 2023-02-18 PROCEDURE — 84100 ASSAY OF PHOSPHORUS: CPT

## 2023-02-18 PROCEDURE — 83735 ASSAY OF MAGNESIUM: CPT

## 2023-02-18 PROCEDURE — 80053 COMPREHEN METABOLIC PANEL: CPT

## 2023-02-18 PROCEDURE — 85025 COMPLETE CBC W/AUTO DIFF WBC: CPT

## 2023-02-18 PROCEDURE — 99239 HOSP IP/OBS DSCHRG MGMT >30: CPT | Performed by: INTERNAL MEDICINE

## 2023-02-18 PROCEDURE — 700102 HCHG RX REV CODE 250 W/ 637 OVERRIDE(OP): Performed by: INTERNAL MEDICINE

## 2023-02-18 PROCEDURE — 83690 ASSAY OF LIPASE: CPT

## 2023-02-18 PROCEDURE — 99024 POSTOP FOLLOW-UP VISIT: CPT | Performed by: PHYSICIAN ASSISTANT

## 2023-02-18 PROCEDURE — A9270 NON-COVERED ITEM OR SERVICE: HCPCS | Performed by: INTERNAL MEDICINE

## 2023-02-18 PROCEDURE — 36415 COLL VENOUS BLD VENIPUNCTURE: CPT

## 2023-02-18 RX ADMIN — Medication 400 MG: at 05:47

## 2023-02-18 RX ADMIN — OMEPRAZOLE 20 MG: 20 CAPSULE, DELAYED RELEASE ORAL at 05:47

## 2023-02-18 RX ADMIN — DULOXETINE HYDROCHLORIDE 60 MG: 20 CAPSULE, DELAYED RELEASE ORAL at 05:47

## 2023-02-18 RX ADMIN — SENNOSIDES AND DOCUSATE SODIUM 2 TABLET: 50; 8.6 TABLET ORAL at 05:47

## 2023-02-18 ASSESSMENT — PAIN DESCRIPTION - PAIN TYPE
TYPE: ACUTE PAIN
TYPE: ACUTE PAIN

## 2023-02-18 NOTE — DISCHARGE INSTRUCTIONS
Discharge Instructions per JESSE VillafanaO.    DIET: Diet Order Diet: Regular    ACTIVITY: As tolerated    A proper diet that is low in grease, fat, and salt, along with 30 minutes of exercise per day will lead to weight loss, and better controlled blood sugar and blood pressure.    DIAGNOSIS: Choledocholithiasis    Follow up with your Primary Care Provider Marilin Schmidt D.N.P. as scheduled or sooner if your symptoms persist or worsen.  Return to Emergency Room for sever chest pain, shortness of breath, signs of a stroke, or any other emergencies.

## 2023-02-18 NOTE — CARE PLAN
The patient is Stable - Low risk of patient condition declining or worsening    Shift Goals  Clinical Goals: pain control, monitor vitals  Patient Goals: discharge tomorrow    Progress made toward(s) clinical / shift goals:      Problem: Knowledge Deficit - Standard  Goal: Patient and family/care givers will demonstrate understanding of plan of care, disease process/condition, diagnostic tests and medications  Outcome: Progressing     Problem: Psychosocial  Goal: Patient's level of anxiety will decrease  Outcome: Progressing     Problem: Pain - Standard  Goal: Alleviation of pain or a reduction in pain to the patient’s comfort goal  Outcome: Progressing    Patient is A&Ox4, up self with steady gait, patient reports having a bowel movement this evening. Lap sites clean, dry, and intact. Pt medicated per MAR, pain well controlled, vitals stable at this time.

## 2023-02-18 NOTE — PROGRESS NOTES
"  DATE: 2/18/2023    Post Operative Day  1 laparoscopic cholecystectomy.    INTERVAL EVENTS:  Pain controlled.  Tolerating regular diet, + BM.  LFTs down trending, T bili normalized.    PHYSICAL EXAMINATION:  Vital Signs: /82   Pulse 60   Temp 36.7 °C (98 °F) (Temporal)   Resp 18   Ht 1.778 m (5' 10\")   Wt 81.6 kg (180 lb)   SpO2 98%     Physical Exam  Abdominal:      General: Abdomen is flat. There is no distension.      Palpations: Abdomen is soft.      Tenderness: There is abdominal tenderness (incisional).      Comments: Incisions CDI with glue x4       LABORATORY VALUES:   Recent Labs     02/16/23  0028 02/17/23  1222 02/18/23  0152   WBC 5.1 7.8 7.5   RBC 4.42* 4.53* 4.42*   HEMOGLOBIN 13.6* 13.7* 13.3*   HEMATOCRIT 39.9* 40.5* 39.4*   MCV 90.3 89.4 89.1   MCH 30.8 30.2 30.1   MCHC 34.1 33.8 33.8   RDW 38.3 36.3 36.9   PLATELETCT 197 210 218   MPV 9.7 9.8 9.7     Recent Labs     02/16/23  0028 02/17/23  1222 02/18/23  0700   SODIUM 139 138 141   POTASSIUM 3.9 3.9 4.0   CHLORIDE 106 105 107   CO2 22 24 25   GLUCOSE 79 104* 104*   BUN 11 11 11   CREATININE 0.86 0.85 0.73   CALCIUM 9.4 8.7 8.9     Recent Labs     02/16/23  0028 02/17/23  1222 02/18/23  0700   ASTSGOT 126* 62* 58*   ALTSGPT 303* 187* 161*   TBILIRUBIN 3.5* 1.2 0.8   IBILIRUBIN  --  0.7  --    DBILIRUBIN  --  0.5  --    ALKPHOSPHAT 342* 308* 280*   GLOBULIN 2.7 2.7 3.0            IMAGING:   MR-FOREIGN FILM MRI   Final Result      US-FOREIGN FILM ULTRASOUND   Final Result      OUTSIDE IMAGES-US ABDOMEN   Final Result      OUTSIDE IMAGES-US ABDOMEN   Final Result      US-RUQ   Final Result         1.  Gallbladder wall thickening, the gallbladder is contracted, and wall thickening with contracted gallbladder is nonspecific but not typical of cholecystitis.      DX-PORTABLE FLUOROSCOPY < 1 HOUR   Final Result      Portable intraoperative imaging with findings as described above.          ASSESSMENT AND PLAN:   No new Assessment & Plan notes " have been filed under this hospital service since the last note was generated.  Service: Surgery General    - May discharge home from surgical standpoint  - Follow up in outpatient ACS clinic 1-2 weeks  - Discharge instructions discussed with patient       ____________________________________     Ivis Bishop P.A.-C.    DD: 2/18/2023  7:38 AM

## 2023-02-18 NOTE — DISCHARGE SUMMARY
Discharge Summary    CHIEF COMPLAINT ON ADMISSION  No chief complaint on file.      Reason for Admission  CBD stone     Admission Date  2/15/2023    CODE STATUS  Full Code    HPI & HOSPITAL COURSE  Mr. Adonis Mann is a 37 y.o. male who presented on 2/15/2023 with abdominal pain, nausea, vomiting, jaundice.  Initially presented to [facility].  An MRCP showed a 9 mm distal common bile duct stone.  LFTs were concerning for choledocholithiasis and patient was transferred to [facility] for GI evaluation.     On presentation bilirubin initially 5.9.  Lipase 52.     GI was consulted.  Status post ERCP with sphincterotomy balloon sweep on 2/16.     Right upper quadrant ultrasound showed gallbladder wall thickening, contracted     Status post laparoscopic cholecystectomy 2/17.  LFTs trended down.  Bilirubin is 0.8 on day of discharge.  Lipase normal after cholecystectomy.  Patient tolerated diet and had a bowel movement.  No recurrence of abdominal pain.     Patient is medically stable to discharge home.  Follow-up with primary care as outpatient.    Therefore, he is discharged in fair and stable condition to home with close outpatient follow-up.    The patient met 2-midnight criteria for an inpatient stay at the time of discharge.    Discharge Date  2/18/2023    FOLLOW UP ITEMS POST DISCHARGE  None    DISCHARGE DIAGNOSES  Principal Problem (Resolved):    Choledocholithiasis POA: Yes  Active Problems:    Controlled substance agreement signed: BFM, clonazepam, CSA/UDS, June 2022 POA: Yes    Depression (Chronic) POA: Yes    Hyperlipidemia POA: Yes    Marijuana smoker POA: Yes  Resolved Problems:    Hyperbilirubinemia POA: Yes      FOLLOW UP  Future Appointments   Date Time Provider Department Center   4/3/2023  8:20 AM JESSE LeeN.P. [facility] None   7/17/2023  9:20 AM Adonis Abdul M.D. [facility] None     JESSE LeeN.P.  [address] [facility] [id number] [phone]    Follow  up        MEDICATIONS ON DISCHARGE     Medication List        CONTINUE taking these medications        Instructions   clonazePAM 1 MG Tabs  Commonly known as: KLONOPIN   Take one-half to one tablet by mouth once a day if needed for anxiety attack for up to 30 days  Indications: Feeling Anxious     DULoxetine 30 MG Cpep  Commonly known as: CYMBALTA   TAKE TWO CAPSULES BY MOUTH DAILY     omeprazole 40 MG delayed-release capsule  Commonly known as: PRILOSEC   Take one cap daily in AM before breakfast              Allergies  No Known Allergies    DIET  Orders Placed This Encounter   Procedures    Diet Order Diet: Regular     Standing Status:   Standing     Number of Occurrences:   1     Order Specific Question:   Diet:     Answer:   Regular [1]       ACTIVITY  As tolerated.  Weight bearing as tolerated    CONSULTATIONS  GI, surgery      PROCEDURES  ERCP, laparoscopic cholecystectomy    LABORATORY  Lab Results   Component Value Date    SODIUM 141 02/18/2023    POTASSIUM 4.0 02/18/2023    CHLORIDE 107 02/18/2023    CO2 25 02/18/2023    GLUCOSE 104 (H) 02/18/2023    BUN 11 02/18/2023    CREATININE 0.73 02/18/2023        Lab Results   Component Value Date    WBC 7.5 02/18/2023    HEMOGLOBIN 13.3 (L) 02/18/2023    HEMATOCRIT 39.4 (L) 02/18/2023    PLATELETCT 218 02/18/2023        I discussed medications and side effects with the patient.  I discussed prognosis and importance of medical compliance with the patient.  I counseled the patient about diet, exercise, weight loss, smoking cessation, and life style modifications.  All questions and concerns have been addressed.  Total time of the discharge process was 39 minutes.

## 2023-02-18 NOTE — PROGRESS NOTES
..Gastroenterology Progress Note               Author:  MALINDA Ibrahim Date & Time Created: 2/17/2023 4:09 PM       Patient ID:  Name:             Adonis Mann  YOB: 1985  Age:                 37 y.o.  male  MRN:               [id number]        Medical Decision Making, by Problem:  Active Hospital Problems    Diagnosis     Marijuana smoker [F12.90]     Choledocholithiasis [K80.50]     Depression [F32.A]     Hyperlipidemia [E78.5]     Hyperbilirubinemia [E80.6]     Controlled substance agreement signed: [facility], clonazepam, CSA/UDS, June 2022 [Z79.899]            Presenting Chief Complaint:  Choledocholithiasis      Interval History:  Adonis Mann is a 37 y.o. male with past medical history of depression who presented 2/15/2023 with abdominal pain and yellowing skin.  Patient describes intermittent abdominal pain for over a year which he saw his PCP and was diagnosed with fatty liver. On Monday night, he experienced sudden episode of midepigastric pain with associated nausea and vomiting. On Wednesday morning, he woke up and noticed yellowing of his eyes and skin with brown urine, which prompted him to come to the ED. He denies hematuria, hematochezia, melena, diarrhea, fever, or chills. He has never had any abdominal surgery.     In the ED, labs remarkable for , , Alk phos 325, lipase 52, T Bili of 5.9, and indirect bili of 1.3. RUQ US significant for thickened gallbladder and 9mm focus in right hepatic lobe favoring a hemangioma. MRCP remarkable for CBD dilation of 14 mm and 9 mm stone within the distal common bile duct.     Patient underwent laparoscopic cholecystectomy 2/17/2023.  Patient seen postoperatively, no complaints.  Feels well, tolerating diet, denies pain.      Hospital Medications:  Current Facility-Administered Medications   Medication Dose Frequency Provider Last Rate Last Admin    Pharmacy Consult Request ...Pain Management Review 1 Each   1 Each PHARMACY TO DOSE Adonis Parikh D.O.        oxyCODONE immediate-release (ROXICODONE) tablet 5 mg  5 mg Q3HRS PRN Adonis Parikh D.O.   5 mg at 02/16/23 1925    Or    oxyCODONE immediate release (ROXICODONE) tablet 10 mg  10 mg Q3HRS PRN Adonis Parikh D.O.        Or    HYDROmorphone (Dilaudid) injection 0.5 mg  0.5 mg Q3HRS PRN Adonis Parikh D.O.        senna-docusate (PERICOLACE or SENOKOT S) 8.6-50 MG per tablet 2 Tablet  2 Tablet BID Sae Rocha M.D.   2 Tablet at 02/17/23 0547    And    polyethylene glycol/lytes (MIRALAX) PACKET 1 Packet  1 Packet QDAY PRN Sae Rocha M.D.        And    magnesium hydroxide (MILK OF MAGNESIA) suspension 30 mL  30 mL QDAY PRN Sae Rocha M.D.        And    bisacodyl (DULCOLAX) suppository 10 mg  10 mg QDAY PRN Sae Rocha M.D.        ondansetron (ZOFRAN) syringe/vial injection 4 mg  4 mg Q4HRS TO Rocha M.D.        ondansetron (ZOFRAN ODT) dispertab 4 mg  4 mg Q4HRS PRAARTI Rocha M.D.        promethazine (PHENERGAN) tablet 12.5-25 mg  12.5-25 mg Q4HRS PRAARTI Rocha M.D.        promethazine (PHENERGAN) suppository 12.5-25 mg  12.5-25 mg Q4HRS PRAARTI Rocha M.D.        prochlorperazine (COMPAZINE) injection 5-10 mg  5-10 mg Q4HRS PRAARTI Rocha M.D.        diphenhydrAMINE (BENADRYL) injection 25 mg  25 mg Q6HRS PRAARTI Rocha M.D.   25 mg at 02/15/23 2244    clonazePAM (KLONOPIN) tablet 1 mg  1 mg QDAY PRN Sae Rocha M.D.   1 mg at 02/16/23 0835    DULoxetine (CYMBALTA) capsule 60 mg  60 mg DAILY Sae Rocha M.D.   60 mg at 02/17/23 0547    omeprazole (PRILOSEC) capsule 20 mg  20 mg BID Sae Rocha M.D.   20 mg at 02/17/23 0547   Last reviewed on 2/17/2023  9:20 AM by Jia Ghosh R.N.       Review of Systems:  Review of Systems   Constitutional:  Negative for chills, fever and malaise/fatigue.   HENT:  Negative for hearing loss.    Eyes:   "Negative for pain.   Respiratory:  Negative for cough and shortness of breath.    Cardiovascular:  Negative for chest pain.   Gastrointestinal:  Negative for abdominal pain, blood in stool, constipation, diarrhea, heartburn, melena, nausea and vomiting.   Genitourinary:  Negative for frequency and hematuria.   Musculoskeletal:  Negative for back pain.   Skin:  Negative for rash.   Neurological:  Negative for dizziness, weakness and headaches.   Psychiatric/Behavioral:  Positive for depression.    All other systems reviewed and are negative.      Vital signs:  Weight/BMI: Body mass index is 25.83 kg/m².  /79   Pulse 62   Temp 36.2 °C (97.1 °F) (Temporal)   Resp 16   Ht 1.778 m (5' 10\")   Wt 81.6 kg (180 lb)   SpO2 96%   Vitals:    02/17/23 1115 02/17/23 1137 02/17/23 1209 02/17/23 1556   BP: 115/60 112/77 115/77 123/79   Pulse: (!) 54 64 60 62   Resp: 12 14 15 16   Temp: 36.6 °C (97.8 °F) 36.6 °C (97.9 °F) 36.2 °C (97.2 °F) 36.2 °C (97.1 °F)   TempSrc: Temporal Temporal Temporal Temporal   SpO2: 94% 94% 92% 96%   Weight:       Height:         Oxygen Therapy:  Pulse Oximetry: 96 %, O2 (LPM): 0, O2 Delivery Device: None - Room Air    Intake/Output Summary (Last 24 hours) at 2/17/2023 1609  Last data filed at 2/17/2023 0940  Gross per 24 hour   Intake 1430 ml   Output 20 ml   Net 1410 ml         Physical Exam:  Physical Exam  Vitals and nursing note reviewed.   Constitutional:       General: He is not in acute distress.     Appearance: Normal appearance. He is normal weight. He is not ill-appearing.   HENT:      Head: Normocephalic.      Right Ear: External ear normal.      Left Ear: External ear normal.      Nose: Nose normal.      Mouth/Throat:      Mouth: Mucous membranes are moist.      Pharynx: Oropharynx is clear.   Eyes:      General: No scleral icterus.  Cardiovascular:      Rate and Rhythm: Normal rate and regular rhythm.      Pulses: Normal pulses.      Heart sounds: Normal heart sounds. "   Pulmonary:      Effort: Pulmonary effort is normal.      Breath sounds: Normal breath sounds.   Abdominal:      General: Abdomen is flat. Bowel sounds are normal.      Palpations: Abdomen is soft.      Tenderness: There is no abdominal tenderness.   Musculoskeletal:         General: Normal range of motion.      Cervical back: Normal range of motion.   Skin:     General: Skin is warm and dry.      Capillary Refill: Capillary refill takes less than 2 seconds.   Neurological:      Mental Status: He is alert and oriented to person, place, and time.   Psychiatric:         Mood and Affect: Mood normal.           Labs:  Recent Labs     02/15/23  0000 02/16/23  0028 02/17/23  1222   SODIUM 138 139 138   POTASSIUM 3.7 3.9 3.9   CHLORIDE 107 106 105   CO2 20* 22 24   BUN 11 11 11   CREATININE 0.8 0.86 0.85   MAGNESIUM  --  1.9 1.9   PHOSPHORUS  --   --  2.8   CALCIUM 9.3 9.4 8.7     Recent Labs     02/15/23  0000 02/16/23  0028 02/17/23  1222   ALTSGPT 451* 303* 187*   ASTSGOT 231* 126* 62*   ALKPHOSPHAT 325* 342* 308*   TBILIRUBIN 5.9* 3.5* 1.2   DBILIRUBIN 4.6*  --  0.5   LIPASE 52  --   --    GLUCOSE 97 79 104*     Recent Labs     02/15/23  0000 02/16/23  0028 02/17/23  1222   WBC 5.3 5.1 7.8   NEUTSPOLYS  --   --  70.60   LYMPHOCYTES  --   --  22.20   MONOCYTES  --   --  5.40   EOSINOPHILS  --   --  0.80   BASOPHILS  --   --  0.50   ASTSGOT 231* 126* 62*   ALTSGPT 451* 303* 187*   ALKPHOSPHAT 325* 342* 308*   TBILIRUBIN 5.9* 3.5* 1.2     Recent Labs     02/15/23  0000 02/16/23  0028 02/17/23  1222   RBC 4.87 4.42* 4.53*   HEMOGLOBIN 14.9 13.6* 13.7*   HEMATOCRIT 43.0 39.9* 40.5*   PLATELETCT 202 197 210   PROTHROMBTM 10.6  --   --    INR 0.99  --   --      Recent Results (from the past 24 hour(s))   Histology Request    Collection Time: 02/17/23  8:54 AM   Result Value Ref Range    Pathology Request Sent to [facility]    CBC WITH DIFFERENTIAL    Collection Time: 02/17/23 12:22 PM   Result Value Ref Range    WBC 7.8 4.8 -  10.8 K/uL    RBC 4.53 (L) 4.70 - 6.10 M/uL    Hemoglobin 13.7 (L) 14.0 - 18.0 g/dL    Hematocrit 40.5 (L) 42.0 - 52.0 %    MCV 89.4 81.4 - 97.8 fL    MCH 30.2 27.0 - 33.0 pg    MCHC 33.8 33.7 - 35.3 g/dL    RDW 36.3 35.9 - 50.0 fL    Platelet Count 210 164 - 446 K/uL    MPV 9.8 9.0 - 12.9 fL    Neutrophils-Polys 70.60 44.00 - 72.00 %    Lymphocytes 22.20 22.00 - 41.00 %    Monocytes 5.40 0.00 - 13.40 %    Eosinophils 0.80 0.00 - 6.90 %    Basophils 0.50 0.00 - 1.80 %    Immature Granulocytes 0.50 0.00 - 0.90 %    Nucleated RBC 0.00 /100 WBC    Neutrophils (Absolute) 5.47 1.82 - 7.42 K/uL    Lymphs (Absolute) 1.72 1.00 - 4.80 K/uL    Monos (Absolute) 0.42 0.00 - 0.85 K/uL    Eos (Absolute) 0.06 0.00 - 0.51 K/uL    Baso (Absolute) 0.04 0.00 - 0.12 K/uL    Immature Granulocytes (abs) 0.04 0.00 - 0.11 K/uL    NRBC (Absolute) 0.00 K/uL   Comp Metabolic Panel    Collection Time: 02/17/23 12:22 PM   Result Value Ref Range    Sodium 138 135 - 145 mmol/L    Potassium 3.9 3.6 - 5.5 mmol/L    Chloride 105 96 - 112 mmol/L    Co2 24 20 - 33 mmol/L    Anion Gap 9.0 7.0 - 16.0    Glucose 104 (H) 65 - 99 mg/dL    Bun 11 8 - 22 mg/dL    Creatinine 0.85 0.50 - 1.40 mg/dL    Calcium 8.7 8.5 - 10.5 mg/dL    AST(SGOT) 62 (H) 12 - 45 U/L    ALT(SGPT) 187 (H) 2 - 50 U/L    Alkaline Phosphatase 308 (H) 30 - 99 U/L    Total Bilirubin 1.2 0.1 - 1.5 mg/dL    Albumin 3.7 3.2 - 4.9 g/dL    Total Protein 6.4 6.0 - 8.2 g/dL    Globulin 2.7 1.9 - 3.5 g/dL    A-G Ratio 1.4 g/dL   MAGNESIUM    Collection Time: 02/17/23 12:22 PM   Result Value Ref Range    Magnesium 1.9 1.5 - 2.5 mg/dL   PHOSPHORUS    Collection Time: 02/17/23 12:22 PM   Result Value Ref Range    Phosphorus 2.8 2.5 - 4.5 mg/dL   BILIRUBIN DIRECT    Collection Time: 02/17/23 12:22 PM   Result Value Ref Range    Direct Bilirubin 0.5 0.1 - 0.5 mg/dL   BILIRUBIN INDIRECT    Collection Time: 02/17/23 12:22 PM   Result Value Ref Range    Indirect Bilirubin 0.7 0.0 - 1.0 mg/dL   COD - Adult  (Type and Screen)    Collection Time: 02/17/23 12:22 PM   Result Value Ref Range    ABO Grouping Only O     Rh Grouping Only POS     Antibody Screen-Cod NEG    CORRECTED CALCIUM    Collection Time: 02/17/23 12:22 PM   Result Value Ref Range    Correct Calcium 8.9 8.5 - 10.5 mg/dL   ESTIMATED GFR    Collection Time: 02/17/23 12:22 PM   Result Value Ref Range    GFR (CKD-EPI) 113 >60 mL/min/1.73 m 2       Radiology Review:  MR-FOREIGN FILM MRI   Final Result      US-FOREIGN FILM ULTRASOUND   Final Result      OUTSIDE IMAGES-US ABDOMEN   Final Result      OUTSIDE IMAGES-US ABDOMEN   Final Result      US-RUQ   Final Result         1.  Gallbladder wall thickening, the gallbladder is contracted, and wall thickening with contracted gallbladder is nonspecific but not typical of cholecystitis.      DX-PORTABLE FLUOROSCOPY < 1 HOUR   Final Result      Portable intraoperative imaging with findings as described above.            MDM (Data Review):   -Records reviewed and summarized in current documentation  -I personally reviewed and interpreted the laboratory results  -I personally reviewed the radiology images    Assessment/Recommendations:  Impressions:  Choledocholithiasis  9mm hemangioma right hepatic lobe  Elevated LFTs  Hyperbilirubinemia  Normocytic anemia  Hyperlipidemia    Recommendations:  Trend LFTs and lipase  Monitor for postprocedure complication including perforation and bleeding  Follow low-fat diet  Pain management per primary team  Okay to discharge in the a.m. from GI perspective if no increase in abdominal pain and LFTs and lipase normalizing    Core Quality Measures   Reviewed items::  Labs, Medications and Radiology reports reviewed

## 2023-02-18 NOTE — CARE PLAN
Problem: Knowledge Deficit - Standard  Goal: Patient and family/care givers will demonstrate understanding of plan of care, disease process/condition, diagnostic tests and medications  Outcome: Progressing     Problem: Psychosocial  Goal: Patient's level of anxiety will decrease  Outcome: Progressing     Problem: Pain - Standard  Goal: Alleviation of pain or a reduction in pain to the patient’s comfort goal  Outcome: Progressing   The patient is Stable - Low risk of patient condition declining or worsening    Shift Goals  Clinical Goals: pain control, monitor vitals  Patient Goals: discharge tomorrow    Progress made toward(s) clinical / shift goals:  doing well    Patient is not progressing towards the following goals:none

## (undated) DEVICE — CANISTER SUCTION 3000ML MECHANICAL FILTER AUTO SHUTOFF MEDI-VAC NONSTERILE LF DISP  (40EA/CA)

## (undated) DEVICE — FILM CASSETTE ENDO

## (undated) DEVICE — SYRINGE DISP. 12 CC LL - (100/BX)

## (undated) DEVICE — JAGTOME RX 39 PRE-LOADED .025 X 260CM

## (undated) DEVICE — SUCTION INSTRUMENT YANKAUER BULBOUS TIP W/O VENT (50EA/CA)

## (undated) DEVICE — BAG SPONGE COUNT 10.25 X 32 - BLUE (250/CA)

## (undated) DEVICE — CANNULA W/SEAL 5X100 Z-THRE - ADED KII (12/BX)

## (undated) DEVICE — GLOVE BIOGEL INDICATOR SZ 6.5 SURGICAL PF LTX - (50PR/BX 4BX/CA)

## (undated) DEVICE — TROCAR Z THREAD12MM OPTICAL - NON BLADED (6/BX)

## (undated) DEVICE — CONTAINER, SPECIMEN, STERILE

## (undated) DEVICE — SET EXTENSION WITH 2 PORTS (48EA/CA) ***PART #2C8610 IS A SUBSTITUTE*****

## (undated) DEVICE — SLEEVE, VASO, THIGH, MED

## (undated) DEVICE — GLOVE BIOGEL INDICATOR SZ 7.5 SURGICAL PF LTX - (50PR/BX 4BX/CA)

## (undated) DEVICE — TUBE E-T HI-LO CUFF 7.5MM (10EA/PK)

## (undated) DEVICE — CANNULA W/ SUPPLY TUBING O2 - (50/CA)

## (undated) DEVICE — GLOVE BIOGEL SZ 6.5 SURGICAL PF LTX (50PR/BX 4BX/CA)

## (undated) DEVICE — KIT ANESTHESIA W/CIRCUIT & 3/LT BAG W/FILTER (20EA/CA)

## (undated) DEVICE — KIT CUSTOM PROCEDURE SINGLE FOR ENDO  (15/CA)

## (undated) DEVICE — BITE BLOCK, DISP.

## (undated) DEVICE — SYRINGE DISP. 50CC LS - (40/BX)

## (undated) DEVICE — SET TUBING PNEUMOCLEAR HIGH FLOW SMOKE EVACUATION (10EA/BX)

## (undated) DEVICE — CHLORAPREP 26 ML APPLICATOR - ORANGE TINT(25/CA)

## (undated) DEVICE — SET LEADWIRE 5 LEAD BEDSIDE DISPOSABLE ECG (1SET OF 5/EA)

## (undated) DEVICE — LACTATED RINGERS INJ 1000 ML - (14EA/CA 60CA/PF)

## (undated) DEVICE — CANISTER SUCTION RIGID RED 1500CC (40EA/CA)

## (undated) DEVICE — GLOVE BIOGEL SZ 7.5 SURGICAL PF LTX - (50PR/BX 4BX/CA)

## (undated) DEVICE — GOWN WARMING STANDARD FLEX - (30/CA)

## (undated) DEVICE — BALLOON RETRIEVAL EXTRACTOR PRO RX   9-12MM

## (undated) DEVICE — TOWEL STOP TIMEOUT SAFETY FLAG (40EA/CA)

## (undated) DEVICE — SUTURE 0 VICRYL PLUS UR-6 - 27 INCH (36/BX)

## (undated) DEVICE — TUBE CONNECTING SUCTION - CLEAR PLASTIC STERILE 72 IN (50EA/CA)

## (undated) DEVICE — SODIUM CHL IRRIGATION 0.9% 1000ML (12EA/CA)

## (undated) DEVICE — SYRINGE 30 ML LS (56/BX)

## (undated) DEVICE — SUTURE GENERAL

## (undated) DEVICE — CLIP MED LG INTNL HRZN TI ESCP - (20/BX)

## (undated) DEVICE — TUBING CLEARLINK DUO-VENT - C-FLO (48EA/CA)

## (undated) DEVICE — DEVICE BIOPSY RX BILIARY SYSTEM CAP (10EA/BX)

## (undated) DEVICE — BLADE SURGICAL #10 - (50/BX)

## (undated) DEVICE — PACK LAP CHOLE OR - (2EA/CA)

## (undated) DEVICE — WATER IRRIGATION STERILE 1000ML (12EA/CA)

## (undated) DEVICE — DERMABOND ADVANCED - (12EA/BX)

## (undated) DEVICE — GLOVE BIOGEL PI ORTHO SZ 6 SURGICAL PF LF (40PR/BX)

## (undated) DEVICE — GLOVE BIOGEL SZ 7 SURGICAL PF LTX - (50PR/BX 4BX/CA)

## (undated) DEVICE — BAG RETRIEVAL 10ML (10EA/BX)

## (undated) DEVICE — ELECTRODE 5MM LHK LAPSCP STERILE DISP- MEGADYNE  (5/CA)

## (undated) DEVICE — SCISSORS 5MM CVD (6EA/BX)

## (undated) DEVICE — SUTURE 4-0 MONOCRYL PLUS PS-2 - 27 INCH (36/BX)

## (undated) DEVICE — ELECTRODE DUAL RETURN W/ CORD - (50/PK)

## (undated) DEVICE — SENSOR OXIMETER ADULT SPO2 RD SET (20EA/BX)

## (undated) DEVICE — NEEDLE INSFL 120MM 14GA VRRS - (20/BX)

## (undated) DEVICE — MANIFOLD NEPTUNE 1 PORT (20/PK)

## (undated) DEVICE — COVER LIGHT HANDLE ALC PLUS DISP (18EA/BX)

## (undated) DEVICE — TROCAR 5X100 NON BLADED Z-TH - READ KII (6/BX)